# Patient Record
Sex: FEMALE | Race: BLACK OR AFRICAN AMERICAN | NOT HISPANIC OR LATINO | Employment: OTHER | URBAN - METROPOLITAN AREA
[De-identification: names, ages, dates, MRNs, and addresses within clinical notes are randomized per-mention and may not be internally consistent; named-entity substitution may affect disease eponyms.]

---

## 2017-09-22 ENCOUNTER — HOSPITAL ENCOUNTER (INPATIENT)
Facility: HOSPITAL | Age: 77
LOS: 3 days | Discharge: HOME | End: 2017-09-25
Attending: EMERGENCY MEDICINE | Admitting: INTERNAL MEDICINE

## 2017-09-22 ENCOUNTER — APPOINTMENT (OUTPATIENT)
Dept: GENERAL RADIOLOGY | Facility: HOSPITAL | Age: 77
End: 2017-09-22

## 2017-09-22 ENCOUNTER — APPOINTMENT (OUTPATIENT)
Dept: CT IMAGING | Facility: HOSPITAL | Age: 77
End: 2017-09-22

## 2017-09-22 DIAGNOSIS — K11.20 PAROTITIS: ICD-10-CM

## 2017-09-22 DIAGNOSIS — I16.9 HYPERTENSIVE CRISIS: Primary | ICD-10-CM

## 2017-09-22 LAB
ALBUMIN SERPL-MCNC: 4.1 G/DL (ref 3.5–5.2)
ALBUMIN/GLOB SERPL: 1.5 G/DL
ALP SERPL-CCNC: 72 U/L (ref 39–117)
ALT SERPL W P-5'-P-CCNC: 13 U/L (ref 1–33)
ANION GAP SERPL CALCULATED.3IONS-SCNC: 9.8 MMOL/L
AST SERPL-CCNC: 20 U/L (ref 1–32)
BASOPHILS # BLD AUTO: 0.03 10*3/MM3 (ref 0–0.2)
BASOPHILS NFR BLD AUTO: 0.8 % (ref 0–1.5)
BILIRUB SERPL-MCNC: 0.3 MG/DL (ref 0.1–1.2)
BUN BLD-MCNC: 10 MG/DL (ref 8–23)
BUN/CREAT SERPL: 7.8 (ref 7–25)
CALCIUM SPEC-SCNC: 10.3 MG/DL (ref 8.6–10.5)
CHLORIDE SERPL-SCNC: 105 MMOL/L (ref 98–107)
CO2 SERPL-SCNC: 30.2 MMOL/L (ref 22–29)
CREAT BLD-MCNC: 1.28 MG/DL (ref 0.57–1)
DEPRECATED RDW RBC AUTO: 45.4 FL (ref 37–54)
EOSINOPHIL # BLD AUTO: 0.1 10*3/MM3 (ref 0–0.7)
EOSINOPHIL NFR BLD AUTO: 2.6 % (ref 0.3–6.2)
ERYTHROCYTE [DISTWIDTH] IN BLOOD BY AUTOMATED COUNT: 14.6 % (ref 11.7–13)
GFR SERPL CREATININE-BSD FRML MDRD: 49 ML/MIN/1.73
GLOBULIN UR ELPH-MCNC: 2.8 GM/DL
GLUCOSE BLD-MCNC: 101 MG/DL (ref 65–99)
HCT VFR BLD AUTO: 38.6 % (ref 35.6–45.5)
HGB BLD-MCNC: 12.8 G/DL (ref 11.9–15.5)
IMM GRANULOCYTES # BLD: 0 10*3/MM3 (ref 0–0.03)
IMM GRANULOCYTES NFR BLD: 0 % (ref 0–0.5)
LYMPHOCYTES # BLD AUTO: 1.17 10*3/MM3 (ref 0.9–4.8)
LYMPHOCYTES NFR BLD AUTO: 30 % (ref 19.6–45.3)
MCH RBC QN AUTO: 27.9 PG (ref 26.9–32)
MCHC RBC AUTO-ENTMCNC: 33.2 G/DL (ref 32.4–36.3)
MCV RBC AUTO: 84.1 FL (ref 80.5–98.2)
MONOCYTES # BLD AUTO: 0.5 10*3/MM3 (ref 0.2–1.2)
MONOCYTES NFR BLD AUTO: 12.8 % (ref 5–12)
NEUTROPHILS # BLD AUTO: 2.1 10*3/MM3 (ref 1.9–8.1)
NEUTROPHILS NFR BLD AUTO: 53.8 % (ref 42.7–76)
NT-PROBNP SERPL-MCNC: 581.2 PG/ML (ref 0–1800)
PLATELET # BLD AUTO: 208 10*3/MM3 (ref 140–500)
PMV BLD AUTO: 10.7 FL (ref 6–12)
POTASSIUM BLD-SCNC: 3.8 MMOL/L (ref 3.5–5.2)
PROT SERPL-MCNC: 6.9 G/DL (ref 6–8.5)
RBC # BLD AUTO: 4.59 10*6/MM3 (ref 3.9–5.2)
SODIUM BLD-SCNC: 145 MMOL/L (ref 136–145)
TROPONIN T SERPL-MCNC: <0.01 NG/ML (ref 0–0.03)
WBC NRBC COR # BLD: 3.9 10*3/MM3 (ref 4.5–10.7)

## 2017-09-22 PROCEDURE — 83880 ASSAY OF NATRIURETIC PEPTIDE: CPT | Performed by: PHYSICIAN ASSISTANT

## 2017-09-22 PROCEDURE — 84484 ASSAY OF TROPONIN QUANT: CPT | Performed by: PHYSICIAN ASSISTANT

## 2017-09-22 PROCEDURE — 85025 COMPLETE CBC W/AUTO DIFF WBC: CPT | Performed by: PHYSICIAN ASSISTANT

## 2017-09-22 PROCEDURE — 71020 HC CHEST PA AND LATERAL: CPT

## 2017-09-22 PROCEDURE — 70486 CT MAXILLOFACIAL W/O DYE: CPT

## 2017-09-22 PROCEDURE — 93005 ELECTROCARDIOGRAM TRACING: CPT | Performed by: PHYSICIAN ASSISTANT

## 2017-09-22 PROCEDURE — 93010 ELECTROCARDIOGRAM REPORT: CPT | Performed by: INTERNAL MEDICINE

## 2017-09-22 PROCEDURE — 80053 COMPREHEN METABOLIC PANEL: CPT | Performed by: PHYSICIAN ASSISTANT

## 2017-09-22 PROCEDURE — 36415 COLL VENOUS BLD VENIPUNCTURE: CPT | Performed by: PHYSICIAN ASSISTANT

## 2017-09-22 PROCEDURE — 99284 EMERGENCY DEPT VISIT MOD MDM: CPT

## 2017-09-22 RX ORDER — BISACODYL 5 MG/1
5 TABLET, DELAYED RELEASE ORAL DAILY PRN
COMMUNITY
End: 2019-04-04

## 2017-09-22 RX ORDER — FOLIC ACID 1 MG/1
1 TABLET ORAL DAILY
COMMUNITY
End: 2019-03-04

## 2017-09-22 RX ORDER — LABETALOL HYDROCHLORIDE 5 MG/ML
10 INJECTION, SOLUTION INTRAVENOUS ONCE
Status: COMPLETED | OUTPATIENT
Start: 2017-09-22 | End: 2017-09-22

## 2017-09-22 RX ORDER — ACYCLOVIR 400 MG/1
TABLET ORAL DAILY
COMMUNITY
Start: 2015-01-15 | End: 2019-08-20 | Stop reason: SDUPTHER

## 2017-09-22 RX ORDER — VALSARTAN 320 MG/1
160 TABLET ORAL DAILY
COMMUNITY
Start: 2015-01-15 | End: 2019-03-04

## 2017-09-22 RX ORDER — HYDRALAZINE HYDROCHLORIDE 50 MG/1
TABLET, FILM COATED ORAL 3 TIMES DAILY
COMMUNITY
Start: 2015-01-15 | End: 2017-09-25 | Stop reason: HOSPADM

## 2017-09-22 RX ORDER — CHOLECALCIFEROL (VITAMIN D3) 125 MCG
TABLET ORAL
COMMUNITY
Start: 2015-01-15 | End: 2017-09-25 | Stop reason: HOSPADM

## 2017-09-22 RX ORDER — FUROSEMIDE 20 MG/1
20 TABLET ORAL DAILY PRN
COMMUNITY
Start: 2015-01-15 | End: 2019-03-04 | Stop reason: ALTCHOICE

## 2017-09-22 RX ORDER — POTASSIUM CHLORIDE 750 MG/1
10 TABLET, FILM COATED, EXTENDED RELEASE ORAL 2 TIMES DAILY PRN
COMMUNITY
End: 2019-03-04

## 2017-09-22 RX ORDER — CARVEDILOL 25 MG/1
TABLET ORAL DAILY
COMMUNITY
Start: 2015-01-15 | End: 2019-03-04 | Stop reason: ALTCHOICE

## 2017-09-22 RX ORDER — OMEPRAZOLE 20 MG/1
20 CAPSULE, DELAYED RELEASE ORAL DAILY
COMMUNITY
Start: 2015-01-15 | End: 2019-03-04

## 2017-09-22 RX ORDER — CETIRIZINE HYDROCHLORIDE 10 MG/1
10 TABLET ORAL DAILY PRN
COMMUNITY
End: 2019-04-04

## 2017-09-22 RX ADMIN — LABETALOL HYDROCHLORIDE 10 MG: 5 INJECTION, SOLUTION INTRAVENOUS at 21:11

## 2017-09-22 RX ADMIN — Medication 5 MG/HR: at 22:50

## 2017-09-22 RX ADMIN — NICARDIPINE HYDROCHLORIDE 5 MG/HR: 25 INJECTION INTRAVENOUS at 22:50

## 2017-09-23 ENCOUNTER — APPOINTMENT (OUTPATIENT)
Dept: CARDIOLOGY | Facility: HOSPITAL | Age: 77
End: 2017-09-23
Attending: INTERNAL MEDICINE

## 2017-09-23 PROBLEM — N17.9 AKI (ACUTE KIDNEY INJURY) (HCC): Status: ACTIVE | Noted: 2017-09-23

## 2017-09-23 PROBLEM — R60.0 LOCALIZED EDEMA: Status: ACTIVE | Noted: 2017-09-23

## 2017-09-23 PROBLEM — K11.20 PAROTIDITIS: Status: ACTIVE | Noted: 2017-09-23

## 2017-09-23 LAB
ANION GAP SERPL CALCULATED.3IONS-SCNC: 15.2 MMOL/L
BASOPHILS # BLD AUTO: 0.04 10*3/MM3 (ref 0–0.2)
BASOPHILS NFR BLD AUTO: 0.7 % (ref 0–1.5)
BH CV ECHO MEAS - DIST REN A EDV LEFT: 7.9 CM/SEC
BH CV ECHO MEAS - DIST REN A PSV LEFT: 59.4 CM/SEC
BH CV ECHO MEAS - DIST REN A RI LEFT: 0.87
BH CV ECHO MEAS - MID REN A EDV LEFT: 15.1 CM/SEC
BH CV ECHO MEAS - MID REN A PSV LEFT: 85.9 CM/SEC
BH CV ECHO MEAS - MID REN A RI LEFT: 0.82
BH CV ECHO MEAS - PROX REN A EDV LEFT: 15.1 CM/SEC
BH CV ECHO MEAS - PROX REN A PSV LEFT: 99 CM/SEC
BH CV ECHO MEAS - PROX REN A RI LEFT: 0.85
BH CV VAS BP LEFT ARM: NORMAL MMHG
BH CV VAS BP RIGHT ARM: NORMAL MMHG
BH CV VAS RENAL AORTIC MID PSV: 83 CM/S
BH CV VAS RENAL HILUM LEFT EDV: 6 CM/S
BH CV VAS RENAL HILUM LEFT PSV: 23 CM/S
BH CV VAS RENAL HILUM RIGHT EDV: 4 CM/S
BH CV VAS RENAL HILUM RIGHT PSV: 28 CM/S
BH CV XLRA MEAS - KID L LEFT: 10.2 CM
BH CV XLRA MEAS - RENAL A ORG RI LEFT: 0.86
BH CV XLRA MEAS DIST REN A EDV RIGHT: 13.1 CM/SEC
BH CV XLRA MEAS DIST REN A PSV RIGHT: 90.7 CM/SEC
BH CV XLRA MEAS DIST REN A RI RIGHT: 0.86
BH CV XLRA MEAS KID L RIGHT: 8.3 CM
BH CV XLRA MEAS KID W RIGHT: 4.2 CM
BH CV XLRA MEAS MID REN A EDV RIGHT: 15.1 CM/SEC
BH CV XLRA MEAS MID REN A PSV RIGHT: 94.2 CM/SEC
BH CV XLRA MEAS MID REN A RI RIGHT: 0.84
BH CV XLRA MEAS PROX REN A EDV RIGHT: 12.4 CM/SEC
BH CV XLRA MEAS PROX REN A PSV RIGHT: 78.4 CM/SEC
BH CV XLRA MEAS PROX REN A RI RIGHT: 0.84
BH CV XLRA MEAS RAR LEFT: 1.2
BH CV XLRA MEAS RAR RIGHT: 1.4
BH CV XLRA MEAS RENAL A ORG EDV LEFT: 13 CM/SEC
BH CV XLRA MEAS RENAL A ORG EDV RIGHT: 16.5 CM/SEC
BH CV XLRA MEAS RENAL A ORG PSV LEFT: 86 CM/SEC
BH CV XLRA MEAS RENAL A ORG PSV RIGHT: 111 CM/SEC
BH CV XLRA MEAS RENAL A ORG RI RIGHT: 0.85
BUN BLD-MCNC: 9 MG/DL (ref 8–23)
BUN/CREAT SERPL: 8.5 (ref 7–25)
CALCIUM SPEC-SCNC: 9.9 MG/DL (ref 8.6–10.5)
CHLORIDE SERPL-SCNC: 105 MMOL/L (ref 98–107)
CO2 SERPL-SCNC: 23.8 MMOL/L (ref 22–29)
CREAT BLD-MCNC: 1.06 MG/DL (ref 0.57–1)
D DIMER PPP FEU-MCNC: 0.92 MCGFEU/ML (ref 0–0.49)
DEPRECATED RDW RBC AUTO: 45 FL (ref 37–54)
EOSINOPHIL # BLD AUTO: 0.13 10*3/MM3 (ref 0–0.7)
EOSINOPHIL NFR BLD AUTO: 2.4 % (ref 0.3–6.2)
ERYTHROCYTE [DISTWIDTH] IN BLOOD BY AUTOMATED COUNT: 14.4 % (ref 11.7–13)
GFR SERPL CREATININE-BSD FRML MDRD: 61 ML/MIN/1.73
GLUCOSE BLD-MCNC: 93 MG/DL (ref 65–99)
HCT VFR BLD AUTO: 39 % (ref 35.6–45.5)
HGB BLD-MCNC: 12.8 G/DL (ref 11.9–15.5)
IMM GRANULOCYTES # BLD: 0.02 10*3/MM3 (ref 0–0.03)
IMM GRANULOCYTES NFR BLD: 0.4 % (ref 0–0.5)
INR PPP: 1.04 (ref 0.9–1.1)
LEFT KIDNEY WIDTH: 5 CM
LEFT RENAL UPPER PARENCHYMA MAX: 20 CM/S
LEFT RENAL UPPER PARENCHYMA MIN: 5 CM/S
LEFT RENAL UPPER PARENCHYMA RI: 0.75
LYMPHOCYTES # BLD AUTO: 1.09 10*3/MM3 (ref 0.9–4.8)
LYMPHOCYTES NFR BLD AUTO: 20.3 % (ref 19.6–45.3)
MAGNESIUM SERPL-MCNC: 2.2 MG/DL (ref 1.6–2.4)
MCH RBC QN AUTO: 27.9 PG (ref 26.9–32)
MCHC RBC AUTO-ENTMCNC: 32.8 G/DL (ref 32.4–36.3)
MCV RBC AUTO: 85.2 FL (ref 80.5–98.2)
MONOCYTES # BLD AUTO: 0.63 10*3/MM3 (ref 0.2–1.2)
MONOCYTES NFR BLD AUTO: 11.8 % (ref 5–12)
NEUTROPHILS # BLD AUTO: 3.45 10*3/MM3 (ref 1.9–8.1)
NEUTROPHILS NFR BLD AUTO: 64.4 % (ref 42.7–76)
PLATELET # BLD AUTO: 196 10*3/MM3 (ref 140–500)
PMV BLD AUTO: 10.7 FL (ref 6–12)
POTASSIUM BLD-SCNC: 3.3 MMOL/L (ref 3.5–5.2)
PROTHROMBIN TIME: 13.2 SECONDS (ref 11.7–14.2)
RBC # BLD AUTO: 4.58 10*6/MM3 (ref 3.9–5.2)
RIGHT RENAL UPPER PARENCHYMA MAX: 13 CM/S
RIGHT RENAL UPPER PARENCHYMA MIN: 3 CM/S
RIGHT RENAL UPPER PARENCHYMA RI: 0.77
SODIUM BLD-SCNC: 144 MMOL/L (ref 136–145)
T4 FREE SERPL-MCNC: 1.44 NG/DL (ref 0.93–1.7)
TSH SERPL DL<=0.05 MIU/L-ACNC: 1.83 MIU/ML (ref 0.27–4.2)
WBC NRBC COR # BLD: 5.36 10*3/MM3 (ref 4.5–10.7)

## 2017-09-23 PROCEDURE — 85379 FIBRIN DEGRADATION QUANT: CPT | Performed by: INTERNAL MEDICINE

## 2017-09-23 PROCEDURE — 93975 VASCULAR STUDY: CPT

## 2017-09-23 PROCEDURE — 84244 ASSAY OF RENIN: CPT | Performed by: INTERNAL MEDICINE

## 2017-09-23 PROCEDURE — 83735 ASSAY OF MAGNESIUM: CPT | Performed by: INTERNAL MEDICINE

## 2017-09-23 PROCEDURE — 85025 COMPLETE CBC W/AUTO DIFF WBC: CPT | Performed by: INTERNAL MEDICINE

## 2017-09-23 PROCEDURE — 84443 ASSAY THYROID STIM HORMONE: CPT | Performed by: INTERNAL MEDICINE

## 2017-09-23 PROCEDURE — 25010000002 ENOXAPARIN PER 10 MG: Performed by: INTERNAL MEDICINE

## 2017-09-23 PROCEDURE — 83835 ASSAY OF METANEPHRINES: CPT | Performed by: INTERNAL MEDICINE

## 2017-09-23 PROCEDURE — 80048 BASIC METABOLIC PNL TOTAL CA: CPT | Performed by: INTERNAL MEDICINE

## 2017-09-23 PROCEDURE — 84439 ASSAY OF FREE THYROXINE: CPT | Performed by: INTERNAL MEDICINE

## 2017-09-23 PROCEDURE — 85610 PROTHROMBIN TIME: CPT | Performed by: INTERNAL MEDICINE

## 2017-09-23 RX ORDER — SODIUM CHLORIDE 9 MG/ML
75 INJECTION, SOLUTION INTRAVENOUS CONTINUOUS
Status: DISCONTINUED | OUTPATIENT
Start: 2017-09-23 | End: 2017-09-24

## 2017-09-23 RX ORDER — SODIUM CHLORIDE 0.9 % (FLUSH) 0.9 %
1-10 SYRINGE (ML) INJECTION AS NEEDED
Status: DISCONTINUED | OUTPATIENT
Start: 2017-09-23 | End: 2017-09-25 | Stop reason: HOSPADM

## 2017-09-23 RX ORDER — HYDROCODONE BITARTRATE AND ACETAMINOPHEN 5; 325 MG/1; MG/1
1 TABLET ORAL EVERY 4 HOURS PRN
Status: DISCONTINUED | OUTPATIENT
Start: 2017-09-23 | End: 2017-09-25 | Stop reason: HOSPADM

## 2017-09-23 RX ORDER — SENNA AND DOCUSATE SODIUM 50; 8.6 MG/1; MG/1
2 TABLET, FILM COATED ORAL NIGHTLY PRN
Status: DISCONTINUED | OUTPATIENT
Start: 2017-09-23 | End: 2017-09-25 | Stop reason: HOSPADM

## 2017-09-23 RX ORDER — AMOXICILLIN AND CLAVULANATE POTASSIUM 875; 125 MG/1; MG/1
1 TABLET, FILM COATED ORAL EVERY 12 HOURS SCHEDULED
Status: DISCONTINUED | OUTPATIENT
Start: 2017-09-23 | End: 2017-09-25 | Stop reason: HOSPADM

## 2017-09-23 RX ORDER — HYDRALAZINE HYDROCHLORIDE 50 MG/1
50 TABLET, FILM COATED ORAL ONCE
Status: COMPLETED | OUTPATIENT
Start: 2017-09-23 | End: 2017-09-23

## 2017-09-23 RX ORDER — PANTOPRAZOLE SODIUM 40 MG/1
40 TABLET, DELAYED RELEASE ORAL EVERY MORNING
Status: DISCONTINUED | OUTPATIENT
Start: 2017-09-23 | End: 2017-09-25 | Stop reason: HOSPADM

## 2017-09-23 RX ORDER — ACETAMINOPHEN 325 MG/1
650 TABLET ORAL EVERY 4 HOURS PRN
Status: DISCONTINUED | OUTPATIENT
Start: 2017-09-23 | End: 2017-09-25 | Stop reason: HOSPADM

## 2017-09-23 RX ORDER — FOLIC ACID 1 MG/1
1 TABLET ORAL DAILY
Status: DISCONTINUED | OUTPATIENT
Start: 2017-09-23 | End: 2017-09-25 | Stop reason: HOSPADM

## 2017-09-23 RX ORDER — HYDRALAZINE HYDROCHLORIDE 20 MG/ML
20 INJECTION INTRAMUSCULAR; INTRAVENOUS EVERY 6 HOURS PRN
Status: DISCONTINUED | OUTPATIENT
Start: 2017-09-23 | End: 2017-09-25 | Stop reason: HOSPADM

## 2017-09-23 RX ORDER — ERGOCALCIFEROL 1.25 MG/1
50000 CAPSULE ORAL
COMMUNITY

## 2017-09-23 RX ORDER — CARVEDILOL 25 MG/1
25 TABLET ORAL 2 TIMES DAILY WITH MEALS
Status: DISCONTINUED | OUTPATIENT
Start: 2017-09-23 | End: 2017-09-25 | Stop reason: HOSPADM

## 2017-09-23 RX ORDER — CETIRIZINE HYDROCHLORIDE 10 MG/1
5 TABLET ORAL DAILY
Status: DISCONTINUED | OUTPATIENT
Start: 2017-09-23 | End: 2017-09-25 | Stop reason: HOSPADM

## 2017-09-23 RX ORDER — ONDANSETRON 4 MG/1
4 TABLET, ORALLY DISINTEGRATING ORAL EVERY 6 HOURS PRN
Status: DISCONTINUED | OUTPATIENT
Start: 2017-09-23 | End: 2017-09-25 | Stop reason: HOSPADM

## 2017-09-23 RX ORDER — CARVEDILOL 25 MG/1
25 TABLET ORAL ONCE
Status: COMPLETED | OUTPATIENT
Start: 2017-09-23 | End: 2017-09-23

## 2017-09-23 RX ORDER — ONDANSETRON 4 MG/1
4 TABLET, FILM COATED ORAL EVERY 6 HOURS PRN
Status: DISCONTINUED | OUTPATIENT
Start: 2017-09-23 | End: 2017-09-25 | Stop reason: HOSPADM

## 2017-09-23 RX ORDER — HYDRALAZINE HYDROCHLORIDE 50 MG/1
50 TABLET, FILM COATED ORAL EVERY 8 HOURS SCHEDULED
Status: DISCONTINUED | OUTPATIENT
Start: 2017-09-23 | End: 2017-09-25

## 2017-09-23 RX ORDER — ONDANSETRON 2 MG/ML
4 INJECTION INTRAMUSCULAR; INTRAVENOUS EVERY 6 HOURS PRN
Status: DISCONTINUED | OUTPATIENT
Start: 2017-09-23 | End: 2017-09-25 | Stop reason: HOSPADM

## 2017-09-23 RX ADMIN — CARVEDILOL 25 MG: 25 TABLET, FILM COATED ORAL at 03:45

## 2017-09-23 RX ADMIN — CETIRIZINE HYDROCHLORIDE 5 MG: 10 TABLET, FILM COATED ORAL at 08:41

## 2017-09-23 RX ADMIN — CARVEDILOL 25 MG: 25 TABLET, FILM COATED ORAL at 17:40

## 2017-09-23 RX ADMIN — FOLIC ACID 1 MG: 1 TABLET ORAL at 08:41

## 2017-09-23 RX ADMIN — PANTOPRAZOLE SODIUM 40 MG: 40 TABLET, DELAYED RELEASE ORAL at 08:41

## 2017-09-23 RX ADMIN — AMOXICILLIN AND CLAVULANATE POTASSIUM 1 TABLET: 875; 125 TABLET, FILM COATED ORAL at 03:45

## 2017-09-23 RX ADMIN — SODIUM CHLORIDE 75 ML/HR: 9 INJECTION, SOLUTION INTRAVENOUS at 15:51

## 2017-09-23 RX ADMIN — SODIUM CHLORIDE 75 ML/HR: 9 INJECTION, SOLUTION INTRAVENOUS at 03:44

## 2017-09-23 RX ADMIN — AMOXICILLIN AND CLAVULANATE POTASSIUM 1 TABLET: 875; 125 TABLET, FILM COATED ORAL at 20:23

## 2017-09-23 RX ADMIN — HYDRALAZINE HYDROCHLORIDE 50 MG: 50 TABLET, FILM COATED ORAL at 03:45

## 2017-09-23 RX ADMIN — AMOXICILLIN AND CLAVULANATE POTASSIUM 1 TABLET: 875; 125 TABLET, FILM COATED ORAL at 08:41

## 2017-09-23 RX ADMIN — HYDRALAZINE HYDROCHLORIDE 50 MG: 50 TABLET, FILM COATED ORAL at 21:37

## 2017-09-23 RX ADMIN — HYDRALAZINE HYDROCHLORIDE 50 MG: 50 TABLET, FILM COATED ORAL at 13:47

## 2017-09-23 RX ADMIN — ENOXAPARIN SODIUM 40 MG: 40 INJECTION SUBCUTANEOUS at 08:41

## 2017-09-23 RX ADMIN — CARVEDILOL 25 MG: 25 TABLET, FILM COATED ORAL at 08:41

## 2017-09-24 LAB
AMPHET+METHAMPHET UR QL: NEGATIVE
BARBITURATES UR QL SCN: NEGATIVE
BENZODIAZ UR QL SCN: NEGATIVE
BILIRUB UR QL STRIP: NEGATIVE
CANNABINOIDS SERPL QL: NEGATIVE
CLARITY UR: CLEAR
COCAINE UR QL: NEGATIVE
COLOR UR: YELLOW
GLUCOSE UR STRIP-MCNC: NEGATIVE MG/DL
HGB UR QL STRIP.AUTO: NEGATIVE
KETONES UR QL STRIP: ABNORMAL
LEUKOCYTE ESTERASE UR QL STRIP.AUTO: NEGATIVE
METHADONE UR QL SCN: NEGATIVE
NITRITE UR QL STRIP: NEGATIVE
OPIATES UR QL: NEGATIVE
OXYCODONE UR QL SCN: NEGATIVE
PH UR STRIP.AUTO: 7 [PH] (ref 5–8)
PROT UR QL STRIP: NEGATIVE
SP GR UR STRIP: 1.01 (ref 1–1.03)
UROBILINOGEN UR QL STRIP: ABNORMAL

## 2017-09-24 PROCEDURE — 82570 ASSAY OF URINE CREATININE: CPT | Performed by: INTERNAL MEDICINE

## 2017-09-24 PROCEDURE — 80307 DRUG TEST PRSMV CHEM ANLYZR: CPT | Performed by: INTERNAL MEDICINE

## 2017-09-24 PROCEDURE — 81003 URINALYSIS AUTO W/O SCOPE: CPT | Performed by: INTERNAL MEDICINE

## 2017-09-24 PROCEDURE — 25010000002 ENOXAPARIN PER 10 MG: Performed by: INTERNAL MEDICINE

## 2017-09-24 PROCEDURE — 83835 ASSAY OF METANEPHRINES: CPT | Performed by: INTERNAL MEDICINE

## 2017-09-24 RX ORDER — VALSARTAN 320 MG/1
320 TABLET ORAL
Status: DISCONTINUED | OUTPATIENT
Start: 2017-09-24 | End: 2017-09-25 | Stop reason: HOSPADM

## 2017-09-24 RX ADMIN — HYDRALAZINE HYDROCHLORIDE 50 MG: 50 TABLET, FILM COATED ORAL at 06:03

## 2017-09-24 RX ADMIN — HYDRALAZINE HYDROCHLORIDE 50 MG: 50 TABLET, FILM COATED ORAL at 14:20

## 2017-09-24 RX ADMIN — AMOXICILLIN AND CLAVULANATE POTASSIUM 1 TABLET: 875; 125 TABLET, FILM COATED ORAL at 21:27

## 2017-09-24 RX ADMIN — CARVEDILOL 25 MG: 25 TABLET, FILM COATED ORAL at 17:18

## 2017-09-24 RX ADMIN — AMOXICILLIN AND CLAVULANATE POTASSIUM 1 TABLET: 875; 125 TABLET, FILM COATED ORAL at 08:13

## 2017-09-24 RX ADMIN — HYDRALAZINE HYDROCHLORIDE 50 MG: 50 TABLET, FILM COATED ORAL at 21:27

## 2017-09-24 RX ADMIN — SODIUM CHLORIDE 75 ML/HR: 9 INJECTION, SOLUTION INTRAVENOUS at 05:03

## 2017-09-24 RX ADMIN — FOLIC ACID 1 MG: 1 TABLET ORAL at 08:12

## 2017-09-24 RX ADMIN — CARVEDILOL 25 MG: 25 TABLET, FILM COATED ORAL at 08:13

## 2017-09-24 RX ADMIN — CETIRIZINE HYDROCHLORIDE 5 MG: 10 TABLET, FILM COATED ORAL at 08:13

## 2017-09-24 RX ADMIN — VALSARTAN 320 MG: 320 TABLET, FILM COATED ORAL at 13:04

## 2017-09-24 RX ADMIN — PANTOPRAZOLE SODIUM 40 MG: 40 TABLET, DELAYED RELEASE ORAL at 06:03

## 2017-09-24 RX ADMIN — ENOXAPARIN SODIUM 40 MG: 40 INJECTION SUBCUTANEOUS at 08:13

## 2017-09-25 VITALS
TEMPERATURE: 97.7 F | OXYGEN SATURATION: 97 % | RESPIRATION RATE: 18 BRPM | HEIGHT: 60 IN | HEART RATE: 74 BPM | WEIGHT: 148 LBS | SYSTOLIC BLOOD PRESSURE: 170 MMHG | BODY MASS INDEX: 29.06 KG/M2 | DIASTOLIC BLOOD PRESSURE: 89 MMHG

## 2017-09-25 PROCEDURE — 25010000002 HYDRALAZINE PER 20 MG: Performed by: INTERNAL MEDICINE

## 2017-09-25 PROCEDURE — 25010000002 INFLUENZA VAC SUBUNIT QUAD 0.5 ML SUSPENSION PREFILLED SYRINGE: Performed by: INTERNAL MEDICINE

## 2017-09-25 PROCEDURE — 90661 CCIIV3 VAC ABX FR 0.5 ML IM: CPT | Performed by: INTERNAL MEDICINE

## 2017-09-25 PROCEDURE — G0008 ADMIN INFLUENZA VIRUS VAC: HCPCS | Performed by: INTERNAL MEDICINE

## 2017-09-25 PROCEDURE — 25010000002 ENOXAPARIN PER 10 MG: Performed by: INTERNAL MEDICINE

## 2017-09-25 RX ORDER — AMOXICILLIN AND CLAVULANATE POTASSIUM 875; 125 MG/1; MG/1
1 TABLET, FILM COATED ORAL EVERY 12 HOURS SCHEDULED
Start: 2017-09-25 | End: 2019-03-04 | Stop reason: ALTCHOICE

## 2017-09-25 RX ORDER — HYDRALAZINE HYDROCHLORIDE 25 MG/1
75 TABLET, FILM COATED ORAL EVERY 8 HOURS SCHEDULED
Start: 2017-09-25 | End: 2019-03-04

## 2017-09-25 RX ADMIN — A/SINGAPORE/GP1908/2015 IVR-180 (H1N1) (AN A/MICHIGAN/45/2015-LIKE VIRUS), A/SINGAPORE/GP2050/2015 (H3N2) (AN A/HONG KONG/4801/2014 - LIKE VIRUS), B/UTAH/9/2014 (A B/PHUKET/3073/2013-LIKE VIRUS), B/HONG KONG/259/2010 (A B/BRISBANE/60/08-LIKE VIRUS) 0.5 ML: 15; 15; 15; 15 INJECTION, SUSPENSION INTRAMUSCULAR at 10:49

## 2017-09-25 RX ADMIN — PANTOPRAZOLE SODIUM 40 MG: 40 TABLET, DELAYED RELEASE ORAL at 06:21

## 2017-09-25 RX ADMIN — CARVEDILOL 25 MG: 25 TABLET, FILM COATED ORAL at 08:24

## 2017-09-25 RX ADMIN — ENOXAPARIN SODIUM 40 MG: 40 INJECTION SUBCUTANEOUS at 08:25

## 2017-09-25 RX ADMIN — HYDRALAZINE HYDROCHLORIDE 75 MG: 50 TABLET, FILM COATED ORAL at 11:58

## 2017-09-25 RX ADMIN — FOLIC ACID 1 MG: 1 TABLET ORAL at 08:24

## 2017-09-25 RX ADMIN — CETIRIZINE HYDROCHLORIDE 5 MG: 10 TABLET, FILM COATED ORAL at 08:24

## 2017-09-25 RX ADMIN — HYDRALAZINE HYDROCHLORIDE 50 MG: 50 TABLET, FILM COATED ORAL at 06:21

## 2017-09-25 RX ADMIN — VALSARTAN 320 MG: 320 TABLET, FILM COATED ORAL at 08:24

## 2017-09-25 RX ADMIN — HYDRALAZINE HYDROCHLORIDE 20 MG: 20 INJECTION INTRAMUSCULAR; INTRAVENOUS at 00:02

## 2017-09-25 RX ADMIN — AMOXICILLIN AND CLAVULANATE POTASSIUM 1 TABLET: 875; 125 TABLET, FILM COATED ORAL at 08:24

## 2017-09-27 LAB
ALDOST SERPL-MCNC: 4.4 NG/DL (ref 0–30)
ALDOST/RENIN PLAS-RTO: 23.3 {RATIO} (ref 0–30)
METANEPHRINE, PL: 22 PG/ML (ref 0–62)
NORMETANEPHRINE, PL: 90 PG/ML (ref 0–145)
RENIN PLAS-CCNC: 0.19 NG/ML/HR (ref 0.17–5.38)

## 2017-09-28 LAB
CREAT 24H UR-MCNC: 45.7 MG/DL
METANEPH/CREAT UR: 0.5 UG/MG CREAT (ref 0–1)
METANEPHS UR-MCNC: 48 UG/L
NORMETANEPHRINE 24H UR-MCNC: 168 UG/L

## 2018-02-06 ENCOUNTER — HOSPITAL ENCOUNTER (EMERGENCY)
Facility: HOSPITAL | Age: 78
Discharge: HOME | End: 2018-02-06
Attending: EMERGENCY MEDICINE | Admitting: EMERGENCY MEDICINE

## 2018-02-06 ENCOUNTER — APPOINTMENT (OUTPATIENT)
Dept: CT IMAGING | Facility: HOSPITAL | Age: 78
End: 2018-02-06

## 2018-02-06 VITALS
DIASTOLIC BLOOD PRESSURE: 81 MMHG | SYSTOLIC BLOOD PRESSURE: 173 MMHG | OXYGEN SATURATION: 98 % | HEIGHT: 60 IN | RESPIRATION RATE: 16 BRPM | WEIGHT: 151 LBS | HEART RATE: 75 BPM | TEMPERATURE: 97.8 F | BODY MASS INDEX: 29.64 KG/M2

## 2018-02-06 DIAGNOSIS — K52.9 GASTROENTERITIS: Primary | ICD-10-CM

## 2018-02-06 LAB
ALBUMIN SERPL-MCNC: 4.5 G/DL (ref 3.5–5.2)
ALBUMIN/GLOB SERPL: 1.3 G/DL
ALP SERPL-CCNC: 87 U/L (ref 39–117)
ALT SERPL W P-5'-P-CCNC: 12 U/L (ref 1–33)
ANION GAP SERPL CALCULATED.3IONS-SCNC: 13.7 MMOL/L
AST SERPL-CCNC: 21 U/L (ref 1–32)
BACTERIA UR QL AUTO: NORMAL /HPF
BASOPHILS # BLD AUTO: 0.02 10*3/MM3 (ref 0–0.2)
BASOPHILS NFR BLD AUTO: 0.2 % (ref 0–1.5)
BILIRUB SERPL-MCNC: 0.5 MG/DL (ref 0.1–1.2)
BILIRUB UR QL STRIP: NEGATIVE
BUN BLD-MCNC: 29 MG/DL (ref 8–23)
BUN/CREAT SERPL: 18.1 (ref 7–25)
CALCIUM SPEC-SCNC: 10.7 MG/DL (ref 8.6–10.5)
CHLORIDE SERPL-SCNC: 100 MMOL/L (ref 98–107)
CLARITY UR: CLEAR
CO2 SERPL-SCNC: 24.3 MMOL/L (ref 22–29)
COLOR UR: YELLOW
CREAT BLD-MCNC: 1.6 MG/DL (ref 0.57–1)
DEPRECATED RDW RBC AUTO: 48.8 FL (ref 37–54)
EOSINOPHIL # BLD AUTO: 0.01 10*3/MM3 (ref 0–0.7)
EOSINOPHIL NFR BLD AUTO: 0.1 % (ref 0.3–6.2)
ERYTHROCYTE [DISTWIDTH] IN BLOOD BY AUTOMATED COUNT: 16.2 % (ref 11.7–13)
FLUAV AG NPH QL: NEGATIVE
FLUBV AG NPH QL IA: NEGATIVE
GFR SERPL CREATININE-BSD FRML MDRD: 38 ML/MIN/1.73
GLOBULIN UR ELPH-MCNC: 3.6 GM/DL
GLUCOSE BLD-MCNC: 119 MG/DL (ref 65–99)
GLUCOSE UR STRIP-MCNC: NEGATIVE MG/DL
HCT VFR BLD AUTO: 42.4 % (ref 35.6–45.5)
HGB BLD-MCNC: 14.2 G/DL (ref 11.9–15.5)
HGB UR QL STRIP.AUTO: ABNORMAL
HYALINE CASTS UR QL AUTO: NORMAL /LPF
IMM GRANULOCYTES # BLD: 0.03 10*3/MM3 (ref 0–0.03)
IMM GRANULOCYTES NFR BLD: 0.3 % (ref 0–0.5)
KETONES UR QL STRIP: NEGATIVE
LEUKOCYTE ESTERASE UR QL STRIP.AUTO: NEGATIVE
LIPASE SERPL-CCNC: 24 U/L (ref 13–60)
LYMPHOCYTES # BLD AUTO: 0.59 10*3/MM3 (ref 0.9–4.8)
LYMPHOCYTES NFR BLD AUTO: 5.9 % (ref 19.6–45.3)
MCH RBC QN AUTO: 27.5 PG (ref 26.9–32)
MCHC RBC AUTO-ENTMCNC: 33.5 G/DL (ref 32.4–36.3)
MCV RBC AUTO: 82.2 FL (ref 80.5–98.2)
MONOCYTES # BLD AUTO: 0.58 10*3/MM3 (ref 0.2–1.2)
MONOCYTES NFR BLD AUTO: 5.8 % (ref 5–12)
NEUTROPHILS # BLD AUTO: 8.72 10*3/MM3 (ref 1.9–8.1)
NEUTROPHILS NFR BLD AUTO: 87.7 % (ref 42.7–76)
NITRITE UR QL STRIP: NEGATIVE
PH UR STRIP.AUTO: 5.5 [PH] (ref 5–8)
PLATELET # BLD AUTO: 206 10*3/MM3 (ref 140–500)
PMV BLD AUTO: 10.7 FL (ref 6–12)
POTASSIUM BLD-SCNC: 4.4 MMOL/L (ref 3.5–5.2)
PROT SERPL-MCNC: 8.1 G/DL (ref 6–8.5)
PROT UR QL STRIP: ABNORMAL
RBC # BLD AUTO: 5.16 10*6/MM3 (ref 3.9–5.2)
RBC # UR: NORMAL /HPF
REF LAB TEST METHOD: NORMAL
SODIUM BLD-SCNC: 138 MMOL/L (ref 136–145)
SP GR UR STRIP: 1.01 (ref 1–1.03)
SQUAMOUS #/AREA URNS HPF: NORMAL /HPF
UROBILINOGEN UR QL STRIP: ABNORMAL
WBC NRBC COR # BLD: 9.95 10*3/MM3 (ref 4.5–10.7)
WBC UR QL AUTO: NORMAL /HPF

## 2018-02-06 PROCEDURE — 81001 URINALYSIS AUTO W/SCOPE: CPT | Performed by: NURSE PRACTITIONER

## 2018-02-06 PROCEDURE — 87804 INFLUENZA ASSAY W/OPTIC: CPT | Performed by: NURSE PRACTITIONER

## 2018-02-06 PROCEDURE — 96376 TX/PRO/DX INJ SAME DRUG ADON: CPT

## 2018-02-06 PROCEDURE — 99285 EMERGENCY DEPT VISIT HI MDM: CPT

## 2018-02-06 PROCEDURE — 96374 THER/PROPH/DIAG INJ IV PUSH: CPT

## 2018-02-06 PROCEDURE — 25010000002 ONDANSETRON PER 1 MG: Performed by: NURSE PRACTITIONER

## 2018-02-06 PROCEDURE — 74176 CT ABD & PELVIS W/O CONTRAST: CPT

## 2018-02-06 PROCEDURE — 80053 COMPREHEN METABOLIC PANEL: CPT | Performed by: NURSE PRACTITIONER

## 2018-02-06 PROCEDURE — 25010000002 ONDANSETRON PER 1 MG: Performed by: EMERGENCY MEDICINE

## 2018-02-06 PROCEDURE — 85025 COMPLETE CBC W/AUTO DIFF WBC: CPT | Performed by: NURSE PRACTITIONER

## 2018-02-06 PROCEDURE — 96375 TX/PRO/DX INJ NEW DRUG ADDON: CPT

## 2018-02-06 PROCEDURE — 83690 ASSAY OF LIPASE: CPT | Performed by: NURSE PRACTITIONER

## 2018-02-06 RX ORDER — ONDANSETRON 2 MG/ML
4 INJECTION INTRAMUSCULAR; INTRAVENOUS ONCE
Status: COMPLETED | OUTPATIENT
Start: 2018-02-06 | End: 2018-02-06

## 2018-02-06 RX ORDER — LABETALOL HYDROCHLORIDE 5 MG/ML
10 INJECTION, SOLUTION INTRAVENOUS ONCE
Status: COMPLETED | OUTPATIENT
Start: 2018-02-06 | End: 2018-02-06

## 2018-02-06 RX ORDER — SPIRONOLACTONE 25 MG/1
25 TABLET ORAL DAILY
COMMUNITY
End: 2019-03-04

## 2018-02-06 RX ORDER — SODIUM CHLORIDE 0.9 % (FLUSH) 0.9 %
10 SYRINGE (ML) INJECTION AS NEEDED
Status: DISCONTINUED | OUTPATIENT
Start: 2018-02-06 | End: 2018-02-06 | Stop reason: HOSPADM

## 2018-02-06 RX ORDER — ONDANSETRON 4 MG/1
4 TABLET, ORALLY DISINTEGRATING ORAL EVERY 8 HOURS PRN
Qty: 15 TABLET | Refills: 0 | Status: SHIPPED | OUTPATIENT
Start: 2018-02-06 | End: 2019-03-04

## 2018-02-06 RX ORDER — HYDROMORPHONE HYDROCHLORIDE 1 MG/ML
0.5 INJECTION, SOLUTION INTRAMUSCULAR; INTRAVENOUS; SUBCUTANEOUS ONCE
Status: COMPLETED | OUTPATIENT
Start: 2018-02-06 | End: 2018-02-06

## 2018-02-06 RX ADMIN — ONDANSETRON 4 MG: 2 INJECTION INTRAMUSCULAR; INTRAVENOUS at 17:28

## 2018-02-06 RX ADMIN — LABETALOL HYDROCHLORIDE 10 MG: 5 INJECTION, SOLUTION INTRAVENOUS at 14:56

## 2018-02-06 RX ADMIN — HYDROMORPHONE HYDROCHLORIDE 0.5 MG: 10 INJECTION INTRAMUSCULAR; INTRAVENOUS; SUBCUTANEOUS at 15:00

## 2018-02-06 RX ADMIN — ONDANSETRON 4 MG: 2 INJECTION INTRAMUSCULAR; INTRAVENOUS at 14:59

## 2018-02-06 RX ADMIN — SODIUM CHLORIDE 1000 ML: 9 INJECTION, SOLUTION INTRAVENOUS at 14:52

## 2018-02-06 NOTE — ED PROVIDER NOTES
Pt presents to the ED with N/V/D that began this morning.  Pt reports associated diaphoresis, abdominal pain, and gas.  Pt denies contact with any sick people.  Pt states that she had fish and coleslaw last night, and no one ate with her.      On exam, Pt is awake, alert, oriented, and in no distress.  Oral mucosa is dry.  Heart is RRR. Lungs are CTAB.    I reviewed Pt's workup.  Labs suggest slight dehydration.  Discussed the plan give liquids in ED to assess her tolerance.  If she is able to tolerate, we discharge with nausea medication.  Pt understands and agrees with the plan, all questions answered.    I supervised care provided by the midlevel provider.    We have discussed this patient's history, physical exam, and treatment plan.   I have reviewed the note and personally saw and examined the patient and agree with the plan of care.    Documentation assistance provided by keven Crowe for Dr. Daugherty.  Information recorded by the scribe was done at my direction and has been verified and validated by me.       Lisa Crowe  02/06/18 2587       Oscar Daugherty MD  02/08/18 3251

## 2018-02-06 NOTE — ED NOTES
Patient given 12 oz po ice water, per Dr. Daugherty for po challenge prior to discharge.      Devin Ordaz RN  02/06/18 4908

## 2018-02-06 NOTE — ED PROVIDER NOTES
EMERGENCY DEPARTMENT ENCOUNTER    CHIEF COMPLAINT  Chief Complaint: N/V   History given by: pt   History limited by: none   Room Number: 17/17  PMD: Katherine Escalante MD      HPI:  Pt is a 77 y.o. female who presents with N/V since waking up at 0700 today.  Patient also complains of diarrhea, diaphoresis, HA, and intermittent abd pain, and states that she has been hypertensive.  Patient denies CP, SOA, back pain, or fever. Pt denies any specific sick contacts. Pt states that she received a flu vaccine this year, and denies recent travel or antibiotic use.   Past Medical History of hypertension, renal disease, and MI with stent placement in 2002.     Duration: since 070 today   Timing: constant  Quality: N/V   Intensity/Severity: moderate   Progression: unchanged   Associated Symptoms:diarrhea, abd pain, diaphoresis, HA  Aggravating Factors: none stated   Alleviating Factors: none stated   Previous Episodes: none stated   Treatment before arrival: none     PAST MEDICAL HISTORY  Active Ambulatory Problems     Diagnosis Date Noted   • Hypertensive crisis 09/22/2017   • Parotiditis 09/23/2017   • IZA (acute kidney injury) 09/23/2017   • Localized edema 09/23/2017     Resolved Ambulatory Problems     Diagnosis Date Noted   • No Resolved Ambulatory Problems     Past Medical History:   Diagnosis Date   • Cardiomegaly    • Hernia    • Herpes    • Hypertension    • Myocardial infarction    • Renal disorder        PAST SURGICAL HISTORY  Past Surgical History:   Procedure Laterality Date   • CYST REMOVAL         FAMILY HISTORY  History reviewed. No pertinent family history.    SOCIAL HISTORY  Social History     Social History   • Marital status:      Spouse name: N/A   • Number of children: N/A   • Years of education: N/A     Occupational History   • Not on file.     Social History Main Topics   • Smoking status: Former Smoker   • Smokeless tobacco: Not on file   • Alcohol use No   • Drug use: No   • Sexual activity:  Defer     Other Topics Concern   • Not on file     Social History Narrative         ALLERGIES  Sulfa antibiotics    REVIEW OF SYSTEMS  Review of Systems   Constitutional: Positive for diaphoresis. Negative for chills and fever.   HENT: Negative for sore throat.    Respiratory: Negative for shortness of breath.    Cardiovascular: Negative for chest pain.   Gastrointestinal: Positive for abdominal pain (intermittent), diarrhea, nausea and vomiting.   Genitourinary: Negative for dysuria.   Musculoskeletal: Negative for back pain.   Skin: Negative for rash.   Neurological: Positive for headaches. Negative for dizziness.   Psychiatric/Behavioral: The patient is not nervous/anxious.        PHYSICAL EXAM  ED Triage Vitals   Temp Heart Rate Resp BP SpO2   02/06/18 1343 02/06/18 1311 02/06/18 1343 02/06/18 1311 02/06/18 1311   97.8 °F (36.6 °C) 67 16 200/94 95 %      Temp src Heart Rate Source Patient Position BP Location FiO2 (%)   02/06/18 1343 02/06/18 1311 02/06/18 1311 -- --   Oral Monitor Sitting         Physical Exam   Constitutional: She is well-developed, well-nourished, and in no distress.   HENT:   Head: Normocephalic.   Mouth/Throat: Mucous membranes are normal.   Eyes: No scleral icterus.   Neck: Normal range of motion.   Cardiovascular: Normal rate, regular rhythm and normal heart sounds.    B/p 205/115   Pulmonary/Chest: Effort normal and breath sounds normal.   Abdominal: Bowel sounds are normal. There is tenderness in the left upper quadrant and left lower quadrant.   Musculoskeletal: Normal range of motion.   Neurological: She is alert.   Skin: Skin is warm and dry.   Psychiatric: Mood and affect normal.   Nursing note and vitals reviewed.      LAB RESULTS  Recent Results (from the past 24 hour(s))   Comprehensive Metabolic Panel    Collection Time: 02/06/18  2:48 PM   Result Value Ref Range    Glucose 119 (H) 65 - 99 mg/dL    BUN 29 (H) 8 - 23 mg/dL    Creatinine 1.60 (H) 0.57 - 1.00 mg/dL    Sodium 138  136 - 145 mmol/L    Potassium 4.4 3.5 - 5.2 mmol/L    Chloride 100 98 - 107 mmol/L    CO2 24.3 22.0 - 29.0 mmol/L    Calcium 10.7 (H) 8.6 - 10.5 mg/dL    Total Protein 8.1 6.0 - 8.5 g/dL    Albumin 4.50 3.50 - 5.20 g/dL    ALT (SGPT) 12 1 - 33 U/L    AST (SGOT) 21 1 - 32 U/L    Alkaline Phosphatase 87 39 - 117 U/L    Total Bilirubin 0.5 0.1 - 1.2 mg/dL    eGFR  African Amer 38 (L) >60 mL/min/1.73    Globulin 3.6 gm/dL    A/G Ratio 1.3 g/dL    BUN/Creatinine Ratio 18.1 7.0 - 25.0    Anion Gap 13.7 mmol/L   Lipase    Collection Time: 02/06/18  2:48 PM   Result Value Ref Range    Lipase 24 13 - 60 U/L   CBC Auto Differential    Collection Time: 02/06/18  2:48 PM   Result Value Ref Range    WBC 9.95 4.50 - 10.70 10*3/mm3    RBC 5.16 3.90 - 5.20 10*6/mm3    Hemoglobin 14.2 11.9 - 15.5 g/dL    Hematocrit 42.4 35.6 - 45.5 %    MCV 82.2 80.5 - 98.2 fL    MCH 27.5 26.9 - 32.0 pg    MCHC 33.5 32.4 - 36.3 g/dL    RDW 16.2 (H) 11.7 - 13.0 %    RDW-SD 48.8 37.0 - 54.0 fl    MPV 10.7 6.0 - 12.0 fL    Platelets 206 140 - 500 10*3/mm3    Neutrophil % 87.7 (H) 42.7 - 76.0 %    Lymphocyte % 5.9 (L) 19.6 - 45.3 %    Monocyte % 5.8 5.0 - 12.0 %    Eosinophil % 0.1 (L) 0.3 - 6.2 %    Basophil % 0.2 0.0 - 1.5 %    Immature Grans % 0.3 0.0 - 0.5 %    Neutrophils, Absolute 8.72 (H) 1.90 - 8.10 10*3/mm3    Lymphocytes, Absolute 0.59 (L) 0.90 - 4.80 10*3/mm3    Monocytes, Absolute 0.58 0.20 - 1.20 10*3/mm3    Eosinophils, Absolute 0.01 0.00 - 0.70 10*3/mm3    Basophils, Absolute 0.02 0.00 - 0.20 10*3/mm3    Immature Grans, Absolute 0.03 0.00 - 0.03 10*3/mm3   Influenza Antigen, Rapid - Swab, Nasopharynx    Collection Time: 02/06/18  2:49 PM   Result Value Ref Range    Influenza A Ag, EIA Negative Negative    Influenza B Ag, EIA Negative Negative       I ordered the above labs and reviewed the results    RADIOLOGY  CT Abdomen Pelvis Without Contrast   Final Result     The CT scan was performed as an emergency procedure through the  abdomen  and pelvis without contrast and demonstrates the followin. The lung bases are clear. There is a small hiatus hernia measuring  4.4 cm. The liver, spleen, pancreas, and both adrenal glands are  unremarkable without intravenous contrast. There is mild atrophic change  involving the right kidney compared to the left. There is no renal  obstruction. The gallbladder shows no gallstones or wall thickening.  2. There is no aortic aneurysm or retroperitoneal lymphadenopathy. There  is scattered diverticulosis throughout the colon but no CT evidence of  diverticulitis. There is scattered fluid in the small bowel which is  nonspecific but could relate to an element of enteritis. No abnormality  is seen in the pelvis.       I ordered the above noted radiological studies and reviewed the images on the PACS system.       PROGRESS AND CONSULTS    1507 Reviewed pt's history and workup with Dr. Daugherty.  At bedside evaluation, they agree with the plan of care.    1533  Pt's b/p ins 189/87 and her heart rate is 77.     1557 Pt care turned over to Dr. Daugherty pending urinalysis and CT abd/pel result.       COURSE & MEDICAL DECISION MAKING  Pertinent Labs and Imaging studies that were ordered and reviewed are noted above.  Results were reviewed/discussed with the patient and they were also made aware of online assess.   Pt also made aware that some labs, such as cultures, will not be resulted during ER visit and follow up with PMD is necessary.     MEDICATIONS GIVEN IN ER  Medications   sodium chloride 0.9 % flush 10 mL (not administered)   sodium chloride 0.9 % bolus 1,000 mL (1,000 mL Intravenous New Bag 18 1452)   HYDROmorphone (DILAUDID) injection 0.5 mg (0.5 mg Intravenous Given 18 1500)   ondansetron (ZOFRAN) injection 4 mg (4 mg Intravenous Given 18 1459)   labetalol (NORMODYNE,TRANDATE) injection 10 mg (10 mg Intravenous Given 18 1456)       BP (!) 189/87 (BP Location: Right arm, Patient Position:  "Lying)  Pulse 77  Temp 97.8 °F (36.6 °C) (Oral)   Resp 17  Ht 152.4 cm (60\")  Wt 68.5 kg (151 lb)  SpO2 94%  BMI 29.49 kg/m2      I personally reviewed the past medical history, past surgical history, social history, family history, current medications and allergies as they appear in this chart.  The scribe's note accurately reflects the work and decisions made by me.     Documentation assistance provided by keven Stauffer for DHAVAL Craft on 2/6/2018 at 4:00 PM. Information recorded by the scribe was done at my direction and has been verified and validated by me.        Michael Stauffer  02/06/18 1609       NAYANA Gurrola  02/08/18 1610    "

## 2018-02-06 NOTE — ED NOTES
Patient vomited while administering zofran.  Patient given warm wash rag for mouth and emesis bag switched.      Devin Ordaz RN  02/06/18 5478

## 2019-03-04 ENCOUNTER — OFFICE VISIT (OUTPATIENT)
Dept: CARDIOLOGY | Facility: CLINIC | Age: 79
End: 2019-03-04

## 2019-03-04 VITALS
SYSTOLIC BLOOD PRESSURE: 183 MMHG | BODY MASS INDEX: 28.53 KG/M2 | DIASTOLIC BLOOD PRESSURE: 77 MMHG | HEIGHT: 63 IN | WEIGHT: 161 LBS | HEART RATE: 70 BPM

## 2019-03-04 DIAGNOSIS — R94.31 ABNORMAL ELECTROCARDIOGRAM: ICD-10-CM

## 2019-03-04 DIAGNOSIS — I10 HYPERTENSION, UNSPECIFIED TYPE: ICD-10-CM

## 2019-03-04 DIAGNOSIS — R07.2 PRECORDIAL PAIN: Primary | ICD-10-CM

## 2019-03-04 PROCEDURE — 99204 OFFICE O/P NEW MOD 45 MIN: CPT | Performed by: INTERNAL MEDICINE

## 2019-03-04 RX ORDER — TORSEMIDE 5 MG/1
5 TABLET ORAL DAILY
Refills: 5 | COMMUNITY
Start: 2019-02-26

## 2019-03-04 RX ORDER — LABETALOL 200 MG/1
200 TABLET, FILM COATED ORAL 2 TIMES DAILY
Qty: 60 TABLET | Refills: 6 | Status: SHIPPED | OUTPATIENT
Start: 2019-03-04

## 2019-03-04 RX ORDER — BROMFENAC SODIUM 0.7 MG/ML
SOLUTION/ DROPS OPHTHALMIC
Refills: 1 | COMMUNITY
Start: 2018-12-28 | End: 2019-03-04

## 2019-03-04 RX ORDER — DILTIAZEM HYDROCHLORIDE 240 MG/1
120 CAPSULE, EXTENDED RELEASE ORAL
Refills: 5 | COMMUNITY
Start: 2019-02-26

## 2019-03-04 NOTE — PROGRESS NOTES
Subjective:        [facility]  Cardiology Consult Note    Patient Identification:  Name: Loren Buenrostro  Age: 78 y.o.  Sex: female  :  1940  MRN: [id number]             CC  CHEST PAIN, SHARP    FLUTTERING    ABNORMAL EKG    BP VERY HIGH      History of Present Illness:   78-year-old female here for the cardiac evaluation, as well as establishment of the care has been complaining of sharp left-sided chest pain moderate in intensity, associated with a fluttering    Patient was also found to have abnormal EKG by primary care physician    Patient's has a history of benign essential arterial hypertension and the blood pressure has been running high lately    Comorbid cardiac risk factors:     Past Medical History:  Past Medical History:   Diagnosis Date   • Cardiomegaly    • Coronary artery disease    • Gout    • Hernia    • Herpes    • Hypertension    • Myocardial infarction (CMS/HCC)     2002- stent placed   • Renal disorder      Past Surgical History:  Past Surgical History:   Procedure Laterality Date   • CYST REMOVAL        Allergies:  Allergies   Allergen Reactions   • Sulfa Antibiotics      Home Meds:    (Not in a hospital admission)  Current Meds:   [unfilled]  Social History:   Social History     Tobacco Use   • Smoking status: Former Smoker   Substance Use Topics   • Alcohol use: No      Family History:  History reviewed. No pertinent family history.     Review of Systems    Constitutional: No weakness,fatigue, fever, rigors, chills   Eyes: No vision changes, eye pain   ENT/oropharynx: No difficulty swallowing, sore throat, epistaxis, changes in hearing   Cardiovascular:  Sharp chest pain   Respiratory: No shortness of breath, dyspnea on exertion, cough, wheezing hemoptysis   Gastrointestinal: No abdominal pain, nausea, vomiting, diarrhea, bloody stools   Genitourinary: No hematuria, dysuria   Neurological: No headache, tremors, numbness,  one-sided weakness    Musculoskeletal: No cramps,  myalgias,  joint pain, joint swelling   Integument: No rash, edema           Constitutional:  Heart Rate:  [70] 70  BP: (183)/(77) 183/77    Physical Exam   General:  Appears in no acute distress  Eyes: PERTL,  HEENT:  No JVD. Thyroid not visibly enlarged. No mucosal pallor or cyanosis  Respiratory: Respirations regular and unlabored at rest. BBS with good air entry in all fields. No crackles, rubs or wheezes auscultated  Cardiovascular: S1S2 Regular rate and rhythm. No murmur, rub or gallop auscultated. No carotid bruits. DP/PT pulses    . No pretibial pitting edema  Gastrointestinal: Abdomen soft, flat, non tender. Bowel sounds present. No hepatosplenomegaly. No ascites  Musculoskeletal: VAUGHN x4. No abnormal movements  Extremities: No digital clubbing or cyanosis  Skin: Color pink. Skin warm and dry to touch. No rashes  No xanthoma  Neuro: AAO x3 CN II-XII grossly intact          Procedures    NSR WITH NON SP ST-T CHANGES PERSONALLY REVIEWED    Cardiographics  ECG:     Telemetry:    Echocardiogram:     Imaging  Chest X-ray:     Lab Review               @[email or web address]@              Assessment:/ Recommendations / Plan:   Patient Active Problem List   Diagnosis   • Hypertensive crisis   • Parotiditis   • IZA (acute kidney injury) (CMS/HCC)   • Localized edema                    ICD-10-CM ICD-9-CM   1. Precordial pain R07.2 786.51   2. Abnormal electrocardiogram R94.31 794.31   3. Hypertension, unspecified type I10 401.9     1. Precordial pain  Considering the patient's symptoms as well as clinical situation and  EKG findings, along with cardiac risk factors, ischemic workup is necessary to rule out ischemic cardiomyopathy, stress induced arrhythmias, and functional capacity for diagnosis as well as prognostic consideration    - Stress Test With Myocardial Perfusion One Day  - Adult Transthoracic Echo Complete W/ Cont if Necessary Per Protocol    2. Abnormal electrocardiogram  Considering patient's medical condition as  well as the risk factors, patient will require echocardiogram for further evaluation for the LV function, four-chamber evaluation, including the pressures, valvular function and  pericardial disease and pericardial effusion    - Stress Test With Myocardial Perfusion One Day  - Adult Transthoracic Echo Complete W/ Cont if Necessary Per Protocol    3. Hypertension, unspecified type  Importance of controlling hypertension and blood pressure  checkup on the regular basis has been explained  Hypertension as a silent killer has been discussed  Risk reduction of the weight and regular exercises to control the hypertension has been explained    - Stress Test With Myocardial Perfusion One Day  - Adult Transthoracic Echo Complete W/ Cont if Necessary Per Protocol       DC COREG    LABETOLOL  200 MG PO BID    Pros and cons of this new medication / change medication has been explained to  the patient    Possible side effects has been explained    Associated need of the blood  Work has been explained    Need for the compliance of the medication has been explained      LEXISCAN, ECHO    SEE IN 2-4 WKS    Labs/tests ordered for am      Pam Srinivasan MD  3/4/2019, 12:06 PM      EMR Dragon/Transcription:   Dictated utilizing Dragon dictation

## 2019-03-21 ENCOUNTER — TELEPHONE (OUTPATIENT)
Dept: CARDIOLOGY | Facility: CLINIC | Age: 79
End: 2019-03-21

## 2019-03-21 NOTE — TELEPHONE ENCOUNTER
Labetalol not helping per pt    Called pt she states she will call me back later today to discuss, she is on her way to her eye doctor.    Per pt he bp running 172/86  62, 187/90  65, today 142/74  61.  Pt states she is having some swelling in legs and feet     Per Dr Srinivasan take 200 mg tid  Pt verbalized understanding

## 2019-03-27 ENCOUNTER — APPOINTMENT (OUTPATIENT)
Dept: CARDIOLOGY | Facility: HOSPITAL | Age: 79
End: 2019-03-27

## 2019-04-03 ENCOUNTER — HOSPITAL ENCOUNTER (OUTPATIENT)
Dept: CARDIOLOGY | Facility: HOSPITAL | Age: 79
Discharge: HOME | End: 2019-04-03
Admitting: INTERNAL MEDICINE

## 2019-04-03 VITALS
HEART RATE: 70 BPM | HEIGHT: 61 IN | BODY MASS INDEX: 30.4 KG/M2 | SYSTOLIC BLOOD PRESSURE: 160 MMHG | DIASTOLIC BLOOD PRESSURE: 91 MMHG | WEIGHT: 161 LBS

## 2019-04-03 LAB
BH CV ECHO MEAS - ACS: 1.9 CM
BH CV ECHO MEAS - AO MAX PG (FULL): 4.2 MMHG
BH CV ECHO MEAS - AO MAX PG: 9.1 MMHG
BH CV ECHO MEAS - AO MEAN PG (FULL): 2 MMHG
BH CV ECHO MEAS - AO MEAN PG: 5 MMHG
BH CV ECHO MEAS - AO ROOT AREA (BSA CORRECTED): 1.6
BH CV ECHO MEAS - AO ROOT AREA: 6.6 CM^2
BH CV ECHO MEAS - AO ROOT DIAM: 2.9 CM
BH CV ECHO MEAS - AO V2 MAX: 151 CM/SEC
BH CV ECHO MEAS - AO V2 MEAN: 111 CM/SEC
BH CV ECHO MEAS - AO V2 VTI: 36.7 CM
BH CV ECHO MEAS - AVA(I,A): 2.3 CM^2
BH CV ECHO MEAS - AVA(I,D): 2.3 CM^2
BH CV ECHO MEAS - AVA(V,A): 2.3 CM^2
BH CV ECHO MEAS - AVA(V,D): 2.3 CM^2
BH CV ECHO MEAS - BSA(HAYCOCK): 1.8 M^2
BH CV ECHO MEAS - BSA: 1.8 M^2
BH CV ECHO MEAS - BZI_BMI: 28.5 KILOGRAMS/M^2
BH CV ECHO MEAS - BZI_METRIC_HEIGHT: 160 CM
BH CV ECHO MEAS - BZI_METRIC_WEIGHT: 73 KG
BH CV ECHO MEAS - EDV(CUBED): 39.3 ML
BH CV ECHO MEAS - EDV(MOD-SP2): 37 ML
BH CV ECHO MEAS - EDV(MOD-SP4): 76 ML
BH CV ECHO MEAS - EDV(TEICH): 47.4 ML
BH CV ECHO MEAS - EF(CUBED): 72.9 %
BH CV ECHO MEAS - EF(MOD-BP): 67 %
BH CV ECHO MEAS - EF(MOD-SP2): 67.6 %
BH CV ECHO MEAS - EF(MOD-SP4): 68.4 %
BH CV ECHO MEAS - EF(TEICH): 65.8 %
BH CV ECHO MEAS - ESV(CUBED): 10.6 ML
BH CV ECHO MEAS - ESV(MOD-SP2): 12 ML
BH CV ECHO MEAS - ESV(MOD-SP4): 24 ML
BH CV ECHO MEAS - ESV(TEICH): 16.2 ML
BH CV ECHO MEAS - FS: 35.3 %
BH CV ECHO MEAS - IVS/LVPW: 1.1
BH CV ECHO MEAS - IVSD: 1.3 CM
BH CV ECHO MEAS - LA DIMENSION: 4.1 CM
BH CV ECHO MEAS - LA/AO: 1.4
BH CV ECHO MEAS - LAT PEAK E' VEL: 6.6 CM/SEC
BH CV ECHO MEAS - LV DIASTOLIC VOL/BSA (35-75): 43.1 ML/M^2
BH CV ECHO MEAS - LV MASS(C)D: 138.8 GRAMS
BH CV ECHO MEAS - LV MASS(C)DI: 78.7 GRAMS/M^2
BH CV ECHO MEAS - LV MAX PG: 4.9 MMHG
BH CV ECHO MEAS - LV MEAN PG: 3 MMHG
BH CV ECHO MEAS - LV SYSTOLIC VOL/BSA (12-30): 13.6 ML/M^2
BH CV ECHO MEAS - LV V1 MAX: 111 CM/SEC
BH CV ECHO MEAS - LV V1 MEAN: 80.2 CM/SEC
BH CV ECHO MEAS - LV V1 VTI: 27 CM
BH CV ECHO MEAS - LVIDD: 3.4 CM
BH CV ECHO MEAS - LVIDS: 2.2 CM
BH CV ECHO MEAS - LVLD AP2: 7.4 CM
BH CV ECHO MEAS - LVLD AP4: 7.6 CM
BH CV ECHO MEAS - LVLS AP2: 5.9 CM
BH CV ECHO MEAS - LVLS AP4: 6.7 CM
BH CV ECHO MEAS - LVOT AREA (M): 3.1 CM^2
BH CV ECHO MEAS - LVOT AREA: 3.1 CM^2
BH CV ECHO MEAS - LVOT DIAM: 2 CM
BH CV ECHO MEAS - LVPWD: 1.2 CM
BH CV ECHO MEAS - MED PEAK E' VEL: 7.7 CM/SEC
BH CV ECHO MEAS - MV A DUR: 0.18 SEC
BH CV ECHO MEAS - MV A MAX VEL: 130 CM/SEC
BH CV ECHO MEAS - MV DEC SLOPE: 381 CM/SEC^2
BH CV ECHO MEAS - MV DEC TIME: 0.21 SEC
BH CV ECHO MEAS - MV E MAX VEL: 82.9 CM/SEC
BH CV ECHO MEAS - MV E/A: 0.64
BH CV ECHO MEAS - MV MAX PG: 6.7 MMHG
BH CV ECHO MEAS - MV MEAN PG: 2 MMHG
BH CV ECHO MEAS - MV P1/2T MAX VEL: 102 CM/SEC
BH CV ECHO MEAS - MV P1/2T: 78.4 MSEC
BH CV ECHO MEAS - MV V2 MAX: 129 CM/SEC
BH CV ECHO MEAS - MV V2 MEAN: 64.8 CM/SEC
BH CV ECHO MEAS - MV V2 VTI: 31 CM
BH CV ECHO MEAS - MVA P1/2T LCG: 2.2 CM^2
BH CV ECHO MEAS - MVA(P1/2T): 2.8 CM^2
BH CV ECHO MEAS - MVA(VTI): 2.7 CM^2
BH CV ECHO MEAS - PA ACC TIME: 0.1 SEC
BH CV ECHO MEAS - PA MAX PG (FULL): 2 MMHG
BH CV ECHO MEAS - PA MAX PG: 4.6 MMHG
BH CV ECHO MEAS - PA PR(ACCEL): 33.1 MMHG
BH CV ECHO MEAS - PA V2 MAX: 107 CM/SEC
BH CV ECHO MEAS - PI END-D VEL: 121 CM/SEC
BH CV ECHO MEAS - PULM A REVS DUR: 0.2 SEC
BH CV ECHO MEAS - PULM A REVS VEL: 24.8 CM/SEC
BH CV ECHO MEAS - PULM DIAS VEL: 29.9 CM/SEC
BH CV ECHO MEAS - PULM S/D: 1.4
BH CV ECHO MEAS - PULM SYS VEL: 41.1 CM/SEC
BH CV ECHO MEAS - PVA(V,A): 2.3 CM^2
BH CV ECHO MEAS - PVA(V,D): 2.3 CM^2
BH CV ECHO MEAS - QP/QS: 0.86
BH CV ECHO MEAS - RAP SYSTOLE: 3 MMHG
BH CV ECHO MEAS - RV MAX PG: 2.5 MMHG
BH CV ECHO MEAS - RV MEAN PG: 2 MMHG
BH CV ECHO MEAS - RV V1 MAX: 79.8 CM/SEC
BH CV ECHO MEAS - RV V1 MEAN: 64.1 CM/SEC
BH CV ECHO MEAS - RV V1 VTI: 23.2 CM
BH CV ECHO MEAS - RVDD: 2.5 CM
BH CV ECHO MEAS - RVOT AREA: 3.1 CM^2
BH CV ECHO MEAS - RVOT DIAM: 2 CM
BH CV ECHO MEAS - RVSP: 22.9 MMHG
BH CV ECHO MEAS - SI(AO): 137.5 ML/M^2
BH CV ECHO MEAS - SI(CUBED): 16.2 ML/M^2
BH CV ECHO MEAS - SI(LVOT): 48.1 ML/M^2
BH CV ECHO MEAS - SI(MOD-SP2): 14.2 ML/M^2
BH CV ECHO MEAS - SI(MOD-SP4): 29.5 ML/M^2
BH CV ECHO MEAS - SI(TEICH): 17.7 ML/M^2
BH CV ECHO MEAS - SV(AO): 242.4 ML
BH CV ECHO MEAS - SV(CUBED): 28.7 ML
BH CV ECHO MEAS - SV(LVOT): 84.8 ML
BH CV ECHO MEAS - SV(MOD-SP2): 25 ML
BH CV ECHO MEAS - SV(MOD-SP4): 52 ML
BH CV ECHO MEAS - SV(RVOT): 72.9 ML
BH CV ECHO MEAS - SV(TEICH): 31.2 ML
BH CV ECHO MEAS - TAPSE (>1.6): 2.3 CM2
BH CV ECHO MEAS - TR MAX VEL: 223 CM/SEC
BH CV ECHO MEASUREMENTS AVERAGE E/E' RATIO: 11.59
BH CV XLRA - RV BASE: 12.8 CM
BH CV XLRA - RV LENGTH: 5.8 CM
BH CV XLRA - RV MID: 2.8 CM
BH CV XLRA - TDI S': 3.5 CM/SEC
LEFT ATRIUM VOLUME INDEX: 26 ML/M2
MAXIMAL PREDICTED HEART RATE: 143 BPM
STRESS TARGET HR: 122 BPM

## 2019-04-03 PROCEDURE — 93306 TTE W/DOPPLER COMPLETE: CPT | Performed by: INTERNAL MEDICINE

## 2019-04-03 PROCEDURE — 93306 TTE W/DOPPLER COMPLETE: CPT

## 2019-04-05 ENCOUNTER — TELEPHONE (OUTPATIENT)
Dept: CARDIOLOGY | Facility: CLINIC | Age: 79
End: 2019-04-05

## 2019-04-05 NOTE — TELEPHONE ENCOUNTER
"Pt Called states the Labatolol 200 mg is not working and she feels \" drained\"  BP yesterday was 178/84  Hr 64  This morning 163/74  74    Coreg was D/C on 3/4/19 and Labatalol 200 mg bid was added.     Was increased to 200 mg tid per [facility] on 3/21/19     Pt states her PCP gave her Hydralazine 25 mg bid on 4/4/19.      Per Soha KRAUS pt to take BP 2 hours after taking BP meds to see if bp is dropping.    Ptstates she will decrease labatolol back to bid due to she is taking Hydralazine to see if needs to let hydralazine start working.    Instructed pt to keep check on bp and call me Monday to let me know if getting any better. If gets any worse over weekend go to ER and/or call office  "

## 2019-04-09 ENCOUNTER — APPOINTMENT (OUTPATIENT)
Dept: GENERAL RADIOLOGY | Facility: HOSPITAL | Age: 79
End: 2019-04-09

## 2019-04-09 ENCOUNTER — APPOINTMENT (OUTPATIENT)
Dept: CT IMAGING | Facility: HOSPITAL | Age: 79
End: 2019-04-09

## 2019-04-09 ENCOUNTER — HOSPITAL ENCOUNTER (EMERGENCY)
Facility: HOSPITAL | Age: 79
Discharge: HOME | End: 2019-04-09
Attending: EMERGENCY MEDICINE | Admitting: EMERGENCY MEDICINE

## 2019-04-09 VITALS
SYSTOLIC BLOOD PRESSURE: 169 MMHG | RESPIRATION RATE: 18 BRPM | WEIGHT: 161.6 LBS | TEMPERATURE: 98.3 F | HEART RATE: 79 BPM | OXYGEN SATURATION: 99 % | DIASTOLIC BLOOD PRESSURE: 92 MMHG | BODY MASS INDEX: 31.73 KG/M2 | HEIGHT: 60 IN

## 2019-04-09 DIAGNOSIS — R51.9 ACUTE NONINTRACTABLE HEADACHE, UNSPECIFIED HEADACHE TYPE: ICD-10-CM

## 2019-04-09 DIAGNOSIS — E87.6 HYPOKALEMIA: ICD-10-CM

## 2019-04-09 DIAGNOSIS — I10 ESSENTIAL HYPERTENSION: Primary | ICD-10-CM

## 2019-04-09 LAB
ALBUMIN SERPL-MCNC: 4.2 G/DL (ref 3.5–5.2)
ALBUMIN/GLOB SERPL: 1.3 G/DL
ALP SERPL-CCNC: 82 U/L (ref 39–117)
ALT SERPL W P-5'-P-CCNC: 14 U/L (ref 1–33)
ANION GAP SERPL CALCULATED.3IONS-SCNC: 15 MMOL/L
AST SERPL-CCNC: 15 U/L (ref 1–32)
BASOPHILS # BLD AUTO: 0.03 10*3/MM3 (ref 0–0.2)
BASOPHILS NFR BLD AUTO: 0.7 % (ref 0–1.5)
BILIRUB SERPL-MCNC: 0.3 MG/DL (ref 0.2–1.2)
BUN BLD-MCNC: 13 MG/DL (ref 8–23)
BUN/CREAT SERPL: 9.6 (ref 7–25)
CALCIUM SPEC-SCNC: 10.6 MG/DL (ref 8.6–10.5)
CHLORIDE SERPL-SCNC: 101 MMOL/L (ref 98–107)
CO2 SERPL-SCNC: 26 MMOL/L (ref 22–29)
CREAT BLD-MCNC: 1.36 MG/DL (ref 0.57–1)
DEPRECATED RDW RBC AUTO: 44 FL (ref 37–54)
EOSINOPHIL # BLD AUTO: 0.11 10*3/MM3 (ref 0–0.4)
EOSINOPHIL NFR BLD AUTO: 2.6 % (ref 0.3–6.2)
ERYTHROCYTE [DISTWIDTH] IN BLOOD BY AUTOMATED COUNT: 15.1 % (ref 12.3–15.4)
GFR SERPL CREATININE-BSD FRML MDRD: 46 ML/MIN/1.73
GLOBULIN UR ELPH-MCNC: 3.2 GM/DL
GLUCOSE BLD-MCNC: 107 MG/DL (ref 65–99)
HCT VFR BLD AUTO: 40.2 % (ref 34–46.6)
HGB BLD-MCNC: 13.3 G/DL (ref 12–15.9)
HOLD SPECIMEN: NORMAL
HOLD SPECIMEN: NORMAL
IMM GRANULOCYTES # BLD AUTO: 0.01 10*3/MM3 (ref 0–0.05)
IMM GRANULOCYTES NFR BLD AUTO: 0.2 % (ref 0–0.5)
INR PPP: 0.99 (ref 0.9–1.1)
LYMPHOCYTES # BLD AUTO: 1.22 10*3/MM3 (ref 0.7–3.1)
LYMPHOCYTES NFR BLD AUTO: 29.3 % (ref 19.6–45.3)
MCH RBC QN AUTO: 26.7 PG (ref 26.6–33)
MCHC RBC AUTO-ENTMCNC: 33.1 G/DL (ref 31.5–35.7)
MCV RBC AUTO: 80.7 FL (ref 79–97)
MONOCYTES # BLD AUTO: 0.48 10*3/MM3 (ref 0.1–0.9)
MONOCYTES NFR BLD AUTO: 11.5 % (ref 5–12)
NEUTROPHILS # BLD AUTO: 2.31 10*3/MM3 (ref 1.4–7)
NEUTROPHILS NFR BLD AUTO: 55.7 % (ref 42.7–76)
NRBC BLD AUTO-RTO: 0 /100 WBC (ref 0–0)
NT-PROBNP SERPL-MCNC: 420.1 PG/ML (ref 5–1800)
PLATELET # BLD AUTO: 224 10*3/MM3 (ref 140–450)
PMV BLD AUTO: 11 FL (ref 6–12)
POTASSIUM BLD-SCNC: 2.7 MMOL/L (ref 3.5–5.2)
PROT SERPL-MCNC: 7.4 G/DL (ref 6–8.5)
PROTHROMBIN TIME: 12.8 SECONDS (ref 11.7–14.2)
RBC # BLD AUTO: 4.98 10*6/MM3 (ref 3.77–5.28)
SODIUM BLD-SCNC: 142 MMOL/L (ref 136–145)
TROPONIN T SERPL-MCNC: <0.01 NG/ML (ref 0–0.03)
WBC NRBC COR # BLD: 4.16 10*3/MM3 (ref 3.4–10.8)
WHOLE BLOOD HOLD SPECIMEN: NORMAL
WHOLE BLOOD HOLD SPECIMEN: NORMAL

## 2019-04-09 PROCEDURE — 93005 ELECTROCARDIOGRAM TRACING: CPT | Performed by: EMERGENCY MEDICINE

## 2019-04-09 PROCEDURE — 99284 EMERGENCY DEPT VISIT MOD MDM: CPT

## 2019-04-09 PROCEDURE — 70450 CT HEAD/BRAIN W/O DYE: CPT

## 2019-04-09 PROCEDURE — 83880 ASSAY OF NATRIURETIC PEPTIDE: CPT | Performed by: EMERGENCY MEDICINE

## 2019-04-09 PROCEDURE — 71046 X-RAY EXAM CHEST 2 VIEWS: CPT

## 2019-04-09 PROCEDURE — 84484 ASSAY OF TROPONIN QUANT: CPT | Performed by: EMERGENCY MEDICINE

## 2019-04-09 PROCEDURE — 85025 COMPLETE CBC W/AUTO DIFF WBC: CPT | Performed by: EMERGENCY MEDICINE

## 2019-04-09 PROCEDURE — 96374 THER/PROPH/DIAG INJ IV PUSH: CPT

## 2019-04-09 PROCEDURE — 25010000002 DIPHENHYDRAMINE PER 50 MG: Performed by: EMERGENCY MEDICINE

## 2019-04-09 PROCEDURE — 85610 PROTHROMBIN TIME: CPT | Performed by: EMERGENCY MEDICINE

## 2019-04-09 PROCEDURE — 96375 TX/PRO/DX INJ NEW DRUG ADDON: CPT

## 2019-04-09 PROCEDURE — 93010 ELECTROCARDIOGRAM REPORT: CPT | Performed by: INTERNAL MEDICINE

## 2019-04-09 PROCEDURE — 25010000002 METOCLOPRAMIDE PER 10 MG: Performed by: EMERGENCY MEDICINE

## 2019-04-09 PROCEDURE — 80053 COMPREHEN METABOLIC PANEL: CPT | Performed by: EMERGENCY MEDICINE

## 2019-04-09 RX ORDER — DIPHENHYDRAMINE HYDROCHLORIDE 50 MG/ML
25 INJECTION INTRAMUSCULAR; INTRAVENOUS ONCE
Status: COMPLETED | OUTPATIENT
Start: 2019-04-09 | End: 2019-04-09

## 2019-04-09 RX ORDER — POTASSIUM CHLORIDE 750 MG/1
40 CAPSULE, EXTENDED RELEASE ORAL ONCE
Status: COMPLETED | OUTPATIENT
Start: 2019-04-09 | End: 2019-04-09

## 2019-04-09 RX ORDER — POTASSIUM CHLORIDE 750 MG/1
10 TABLET, FILM COATED, EXTENDED RELEASE ORAL 2 TIMES DAILY
Qty: 14 TABLET | Refills: 0 | Status: SHIPPED | OUTPATIENT
Start: 2019-04-09 | End: 2019-04-09 | Stop reason: SDUPTHER

## 2019-04-09 RX ORDER — HYDRALAZINE HYDROCHLORIDE 25 MG/1
25 TABLET, FILM COATED ORAL 3 TIMES DAILY
COMMUNITY

## 2019-04-09 RX ORDER — RANITIDINE HCL 75 MG
75 TABLET ORAL DAILY
COMMUNITY
End: 2021-01-22 | Stop reason: HOSPADM

## 2019-04-09 RX ORDER — METOCLOPRAMIDE HYDROCHLORIDE 5 MG/ML
10 INJECTION INTRAMUSCULAR; INTRAVENOUS ONCE
Status: COMPLETED | OUTPATIENT
Start: 2019-04-09 | End: 2019-04-09

## 2019-04-09 RX ORDER — POTASSIUM CHLORIDE 750 MG/1
10 TABLET, FILM COATED, EXTENDED RELEASE ORAL 2 TIMES DAILY
Qty: 14 TABLET | Refills: 0 | Status: SHIPPED | OUTPATIENT
Start: 2019-04-09

## 2019-04-09 RX ORDER — TIMOLOL MALEATE 5 MG/ML
1 SOLUTION/ DROPS OPHTHALMIC DAILY
COMMUNITY

## 2019-04-09 RX ORDER — SODIUM CHLORIDE 0.9 % (FLUSH) 0.9 %
10 SYRINGE (ML) INJECTION AS NEEDED
Status: DISCONTINUED | OUTPATIENT
Start: 2019-04-09 | End: 2019-04-09 | Stop reason: HOSPADM

## 2019-04-09 RX ADMIN — POTASSIUM CHLORIDE 40 MEQ: 750 CAPSULE, EXTENDED RELEASE ORAL at 06:26

## 2019-04-09 RX ADMIN — DIPHENHYDRAMINE HYDROCHLORIDE 25 MG: 50 INJECTION, SOLUTION INTRAMUSCULAR; INTRAVENOUS at 07:43

## 2019-04-09 RX ADMIN — METOCLOPRAMIDE 10 MG: 5 INJECTION, SOLUTION INTRAMUSCULAR; INTRAVENOUS at 07:43

## 2019-04-09 NOTE — DISCHARGE INSTRUCTIONS
You are advised to follow closely with Dr Escalante and Dr Srinivasan in 2-3 days for recheck, blood pressure recheck, follow up of low potassium levels, final results of lab work and imaging testing, and further testing/treatment as needed.    Low sodium diet  Take hydralazine 3 times daily as previously directed  Elevate legs while sitting  Compression stockings while upright  Eat foods high in potassium in your daily diet  Melatonin 0.3 to 5mg nightly    Please return to the emergency department immediately with chest pain different than usual for you, shortness of air, abdominal pain, persistent vomiting/fever, blood in emesis or stool, lightheadedness/fainting, problems with speech, one sided weakness/numbness, new incontinence, problems with vision, altered mental status or for worsening of symptoms or other concerns.

## 2019-04-09 NOTE — ED NOTES
MD notified of Celina Devi RN  04/09/19 0442    
Pt took 25mg Hydralazine per home pill bottle.      Celina Salomon, RN  04/09/19 0603    
Pt via PV with c/o of high blood pressure and SOA off and on for the past week.    197/97 at home   Hx of HTN     Wilber, Tana, CHIVO  04/09/19 9418    
(0) independent

## 2019-04-09 NOTE — ED PROVIDER NOTES
EMERGENCY DEPARTMENT ENCOUNTER    CHIEF COMPLAINT  Chief Complaint: Shortness of air   History given by: patient   History limited by: n/a  Room Number: 13/13  PMD: Katherine Escalante MD Dr Chandiramani, cardiology    HPI:  Pt is a 78 y.o. female who presents complaining of concern about her blood pressure being high with SOA that has been intermittent for the past week. Pt states that the SOA only occurs at night, but will occur when she is supine or sitting up.  She also complains of left anterior chest pain, described as aching, that has been intermittent for a while, most recently occurred 2 nights ago. She also complains of tremors in her BLE, which she states has increased. Pt also complains of edema to her ankles and face that began after started on Labetalol 1 month ago. Pt also complains of left eye pain similar to previous.  She reports a hx of glaucoma, and reports compliance with her eye drops.     Duration:  1 week  Onset: gradual  Timing: intermittent   Location: respiratory   Radiation: none  Quality: SOA  Intensity/Severity: moderate  Progression: unchanged  Associated Symptoms: CP, tremors, BLE edema  Aggravating Factors: present at night  Alleviating Factors: none    PAST MEDICAL HISTORY  Active Ambulatory Problems     Diagnosis Date Noted   • Hypertensive crisis 09/22/2017   • Parotiditis 09/23/2017   • IZA (acute kidney injury) (CMS/HCC) 09/23/2017   • Localized edema 09/23/2017     Resolved Ambulatory Problems     Diagnosis Date Noted   • No Resolved Ambulatory Problems     Past Medical History:   Diagnosis Date   • Cardiomegaly    • Coronary artery disease    • Gout    • Hernia    • Herpes    • Hypertension    • Myocardial infarction (CMS/HCC)    • Renal disorder        PAST SURGICAL HISTORY  Past Surgical History:   Procedure Laterality Date   • CYST REMOVAL         FAMILY HISTORY  History reviewed. No pertinent family history.    SOCIAL HISTORY  Social History     Socioeconomic History   •  Marital status:      Spouse name: Not on file   • Number of children: Not on file   • Years of education: Not on file   • Highest education level: Not on file   Tobacco Use   • Smoking status: Former Smoker   Substance and Sexual Activity   • Alcohol use: No   • Drug use: No   • Sexual activity: Defer       ALLERGIES  Sulfa antibiotics    REVIEW OF SYSTEMS  Review of Systems   Constitutional: Negative for chills and fever.   HENT: Negative for rhinorrhea and sore throat.    Eyes: Positive for pain (left). Negative for visual disturbance.   Respiratory: Positive for shortness of breath. Negative for cough.    Cardiovascular: Positive for chest pain and leg swelling. Negative for palpitations.   Gastrointestinal: Negative for abdominal pain, diarrhea and vomiting.   Endocrine: Negative.    Genitourinary: Negative for decreased urine volume, dysuria and frequency.   Musculoskeletal: Negative for neck pain.   Skin: Negative for rash.   Neurological: Positive for tremors. Negative for syncope and headaches.   Psychiatric/Behavioral: Negative.    All other systems reviewed and are negative.      PHYSICAL EXAM  ED Triage Vitals   Temp Heart Rate Resp BP SpO2   04/09/19 0415 04/09/19 0415 04/09/19 0415 04/09/19 0425 04/09/19 0415   98.5 °F (36.9 °C) 85 20 (!) 206/100 100 %      Temp src Heart Rate Source Patient Position BP Location FiO2 (%)   04/09/19 0415 -- -- -- --   Tympanic           Physical Exam   Constitutional: She is oriented to person, place, and time.  Non-toxic appearance. She appears distressed (mild).   HENT:   Head: Normocephalic and atraumatic.   Eyes: EOM are normal.   Neck: Normal range of motion. Neck supple.   Cardiovascular: Normal rate, regular rhythm, normal heart sounds and intact distal pulses.   No murmur heard.  Pulses:       Posterior tibial pulses are 2+ on the right side, and 2+ on the left side.   Pulmonary/Chest: Effort normal and breath sounds normal. No respiratory distress.    Abdominal: Soft. Bowel sounds are normal. There is no tenderness. There is no rebound and no guarding.   Musculoskeletal: Normal range of motion. She exhibits edema (trace edema BLE).   Neurological: She is alert and oriented to person, place, and time. She has normal strength.   Skin: Skin is warm and dry.   Psychiatric: Affect normal.   Nursing note and vitals reviewed.      LAB RESULTS  Lab Results (last 24 hours)     Procedure Component Value Units Date/Time    CBC & Differential [[id number]] Collected:  04/09/19 0437    Specimen:  Blood Updated:  04/09/19 0507    Narrative:       The following orders were created for panel order CBC & Differential.  Procedure                               Abnormality         Status                     ---------                               -----------         ------                     CBC Auto Differential[[id number]]        Normal              Final result                 Please view results for these tests on the individual orders.    Comprehensive Metabolic Panel [[id number]]  (Abnormal) Collected:  04/09/19 0437    Specimen:  Blood Updated:  04/09/19 0513     Glucose 107 mg/dL      BUN 13 mg/dL      Creatinine 1.36 mg/dL      Sodium 142 mmol/L      Potassium 2.7 mmol/L      Chloride 101 mmol/L      CO2 26.0 mmol/L      Calcium 10.6 mg/dL      Total Protein 7.4 g/dL      Albumin 4.20 g/dL      ALT (SGPT) 14 U/L      AST (SGOT) 15 U/L      Alkaline Phosphatase 82 U/L      Total Bilirubin 0.3 mg/dL      eGFR  African Amer 46 mL/min/1.73      Globulin 3.2 gm/dL      A/G Ratio 1.3 g/dL      BUN/Creatinine Ratio 9.6     Anion Gap 15.0 mmol/L     Narrative:       GFR Normal >60  Chronic Kidney Disease <60  Kidney Failure <15    BNP [[id number]]  (Normal) Collected:  04/09/19 0437    Specimen:  Blood Updated:  04/09/19 0508     proBNP 420.1 pg/mL     Narrative:       Among patients with dyspnea, NT-proBNP is highly sensitive for the detection of acute congestive heart failure. In  addition NT-proBNP of <300 pg/ml effectively rules out acute congestive heart failure with 99% negative predictive value.    Troponin [[id number]]  (Normal) Collected:  04/09/19 0437    Specimen:  Blood Updated:  04/09/19 0508     Troponin T <0.010 ng/mL     Narrative:       Troponin T Reference Range:  <= 0.03 ng/mL-   Negative for AMI  >0.03 ng/mL-     Abnormal for myocardial necrosis.  Clinicians would have to utilize clinical acumen, EKG, Troponin and serial changes to determine if it is an Acute Myocardial Infarction or myocardial injury due to an underlying chronic condition.     Protime-INR [[id number]]  (Normal) Collected:  04/09/19 0437    Specimen:  Blood Updated:  04/09/19 0514     Protime 12.8 Seconds      INR 0.99    CBC Auto Differential [[id number]]  (Normal) Collected:  04/09/19 0437    Specimen:  Blood Updated:  04/09/19 0507     WBC 4.16 10*3/mm3      RBC 4.98 10*6/mm3      Hemoglobin 13.3 g/dL      Hematocrit 40.2 %      MCV 80.7 fL      MCH 26.7 pg      MCHC 33.1 g/dL      RDW 15.1 %      RDW-SD 44.0 fl      MPV 11.0 fL      Platelets 224 10*3/mm3      Neutrophil % 55.7 %      Lymphocyte % 29.3 %      Monocyte % 11.5 %      Eosinophil % 2.6 %      Basophil % 0.7 %      Immature Grans % 0.2 %      Neutrophils, Absolute 2.31 10*3/mm3      Lymphocytes, Absolute 1.22 10*3/mm3      Monocytes, Absolute 0.48 10*3/mm3      Eosinophils, Absolute 0.11 10*3/mm3      Basophils, Absolute 0.03 10*3/mm3      Immature Grans, Absolute 0.01 10*3/mm3      nRBC 0.0 /100 WBC           I ordered the above labs and reviewed the results    RADIOLOGY  XR Chest 2 View   Final Result   1. No active disease.       This report was finalized on 4/9/2019 5:38 AM by Freddie Spivey M.D.          CT Head Without Contrast    (Results Pending)        I ordered the above noted radiological studies. Interpreted by radiologist. Reviewed by me in PACS.       PROCEDURES  Procedures    EKG          EKG time: 04;39  Rhythm/Rate: NSR 60's  P  waves and ID: nml  QRS, axis: LVH  ST and T waves: non specific ST findings in inferior leads     Interpreted Contemporaneously by me, independently viewed  unchanged compared to prior 9/22/17    PROGRESS AND CONSULTS       04:29  CXR, CMP, CBC, BNP, troponin, PT/INR, and EKG ordered.     05;12  BP- (!) 206/100 HR- 85 Temp- 98.5 °F (36.9 °C) (Tympanic) O2 sat- 100%  Informed pt that the EKG shows NAD. Awaiting labs and CXR. Will have pt take a dose of her home hydralazine to treat hypertension. understands and agrees with the plan, all questions answered.    05:46  Potassium ordered to treat hypokalemia.     06:35  Pt instructed to take home dose of Diltiazem and Furosamide    Pt in Ed feels better, encouraged to take hydralazine 3 times daily with other meds as previous and follow closely with her PCP, Dr Srinivasan, and her ophthalmologist this week for recheck and further testing/treatment as needed.     MEDICAL DECISION MAKING  Results were reviewed/discussed with the patient and they were also made aware of online access. Pt also made aware that some labs, such as cultures, will not be resulted during ER visit and follow up with PMD is necessary.     MDM  Number of Diagnoses or Management Options     Amount and/or Complexity of Data Reviewed  Clinical lab tests: ordered and reviewed  Tests in the radiology section of CPT®: ordered and reviewed  Tests in the medicine section of CPT®: ordered and reviewed (See EKG procedure note. )  Decide to obtain previous medical records or to obtain history from someone other than the patient: yes  Review and summarize past medical records: yes (Echo on 4/3/19- Left ventricular diastolic dysfunction (grade I) consistent with impaired relaxation. Calculated EF = 67%)    Patient Progress  Patient progress: stable         DIAGNOSIS  Final diagnoses:   Essential hypertension   Acute nonintractable headache, unspecified headache type   Hypokalemia        DISPOSITION  DISCHARGE    Patient discharged in stable condition.    Reviewed implications of results, diagnosis, meds, responsibility to follow up, warning signs and symptoms of possible worsening, potential complications and reasons to return to ER.    Patient/Family voiced understanding of above instructions.    Discussed plan for discharge, as there is no emergent indication for admission. Patient referred to primary care provider for BP management due to today's BP. Pt/family is agreeable and understands need for follow up and repeat testing.  Pt is aware that discharge does not mean that nothing is wrong but it indicates no emergency is present that requires admission and they must continue care with follow-up as given below or physician of their choice.     FOLLOW-UP  Katherine Escalante MD  [address] [facility] [id number] [phone]    Schedule an appointment as soon as possible for a visit in 3 days  EVEN IF WELL    Pam Srinivasan MD  [address] [facility] [id number] [phone]    Schedule an appointment as soon as possible for a visit in 3 days  EVEN IF WELL         Medication List      New Prescriptions    potassium chloride 10 MEQ CR tablet  Commonly known as:  K-DUR  Take 1 tablet by mouth 2 (Two) Times a Day.          Latest Documented Vital Signs:  As of 6:52 AM  BP- 169/92 HR- 69 Temp- 98.5 °F (36.9 °C) (Tympanic) O2 sat- 99%    --  Documentation assistance provided by keven Conrad for Dr Yin.  Information recorded by the scribe was done at my direction and has been verified and validated by me.       Ariadne Conrad  04/09/19 [id number]       Cecilia Yin MD  04/11/19 [id number]

## 2019-04-17 ENCOUNTER — APPOINTMENT (OUTPATIENT)
Dept: CARDIOLOGY | Facility: HOSPITAL | Age: 79
End: 2019-04-17

## 2019-04-17 ENCOUNTER — TELEPHONE (OUTPATIENT)
Dept: CARDIOLOGY | Facility: CLINIC | Age: 79
End: 2019-04-17

## 2019-08-20 RX ORDER — ACYCLOVIR 400 MG/1
400 TABLET ORAL DAILY
Qty: 30 TABLET | Refills: 0 | Status: SHIPPED | OUTPATIENT
Start: 2019-08-20 | End: 2019-09-27 | Stop reason: SDUPTHER

## 2019-09-27 DIAGNOSIS — B02.9 HERPES ZOSTER WITHOUT COMPLICATION: Primary | ICD-10-CM

## 2019-09-27 RX ORDER — ACYCLOVIR 400 MG/1
400 TABLET ORAL DAILY
Qty: 30 TABLET | Refills: 0 | Status: SHIPPED | OUTPATIENT
Start: 2019-09-27 | End: 2019-11-08 | Stop reason: SDUPTHER

## 2019-11-08 DIAGNOSIS — B02.9 HERPES ZOSTER WITHOUT COMPLICATION: ICD-10-CM

## 2019-11-08 RX ORDER — ACYCLOVIR 400 MG/1
400 TABLET ORAL DAILY
Qty: 30 TABLET | Refills: 0 | Status: SHIPPED | OUTPATIENT
Start: 2019-11-08 | End: 2019-12-19 | Stop reason: SDUPTHER

## 2019-12-19 DIAGNOSIS — B02.9 HERPES ZOSTER WITHOUT COMPLICATION: ICD-10-CM

## 2019-12-19 RX ORDER — ACYCLOVIR 400 MG/1
400 TABLET ORAL DAILY
Qty: 30 TABLET | Refills: 0 | Status: SHIPPED | OUTPATIENT
Start: 2019-12-19

## 2020-01-27 DIAGNOSIS — B02.9 HERPES ZOSTER WITHOUT COMPLICATION: ICD-10-CM

## 2020-11-18 ENCOUNTER — APPOINTMENT (OUTPATIENT)
Dept: CT IMAGING | Facility: HOSPITAL | Age: 80
End: 2020-11-18

## 2020-11-18 ENCOUNTER — HOSPITAL ENCOUNTER (EMERGENCY)
Facility: HOSPITAL | Age: 80
Discharge: HOME | End: 2020-11-18
Attending: EMERGENCY MEDICINE | Admitting: EMERGENCY MEDICINE

## 2020-11-18 VITALS
HEART RATE: 77 BPM | TEMPERATURE: 97.6 F | SYSTOLIC BLOOD PRESSURE: 146 MMHG | BODY MASS INDEX: 31.02 KG/M2 | OXYGEN SATURATION: 99 % | RESPIRATION RATE: 14 BRPM | DIASTOLIC BLOOD PRESSURE: 77 MMHG | HEIGHT: 60 IN | WEIGHT: 158 LBS

## 2020-11-18 DIAGNOSIS — N18.9 CHRONIC RENAL IMPAIRMENT, UNSPECIFIED CKD STAGE: ICD-10-CM

## 2020-11-18 DIAGNOSIS — R55 SYNCOPE, UNSPECIFIED SYNCOPE TYPE: Primary | ICD-10-CM

## 2020-11-18 LAB
ALBUMIN SERPL-MCNC: 4.1 G/DL (ref 3.5–5.2)
ALBUMIN/GLOB SERPL: 1.2 G/DL
ALP SERPL-CCNC: 88 U/L (ref 39–117)
ALT SERPL W P-5'-P-CCNC: 17 U/L (ref 1–33)
ANION GAP SERPL CALCULATED.3IONS-SCNC: 7.8 MMOL/L (ref 5–15)
AST SERPL-CCNC: 24 U/L (ref 1–32)
BASOPHILS # BLD AUTO: 0.03 10*3/MM3 (ref 0–0.2)
BASOPHILS NFR BLD AUTO: 0.5 % (ref 0–1.5)
BILIRUB SERPL-MCNC: 0.2 MG/DL (ref 0–1.2)
BUN SERPL-MCNC: 20 MG/DL (ref 8–23)
BUN/CREAT SERPL: 12 (ref 7–25)
CALCIUM SPEC-SCNC: 10.1 MG/DL (ref 8.6–10.5)
CHLORIDE SERPL-SCNC: 104 MMOL/L (ref 98–107)
CO2 SERPL-SCNC: 28.2 MMOL/L (ref 22–29)
CREAT SERPL-MCNC: 1.67 MG/DL (ref 0.57–1)
DEPRECATED RDW RBC AUTO: 40.8 FL (ref 37–54)
EOSINOPHIL # BLD AUTO: 0.04 10*3/MM3 (ref 0–0.4)
EOSINOPHIL NFR BLD AUTO: 0.7 % (ref 0.3–6.2)
ERYTHROCYTE [DISTWIDTH] IN BLOOD BY AUTOMATED COUNT: 13.7 % (ref 12.3–15.4)
GFR SERPL CREATININE-BSD FRML MDRD: 36 ML/MIN/1.73
GLOBULIN UR ELPH-MCNC: 3.3 GM/DL
GLUCOSE SERPL-MCNC: 99 MG/DL (ref 65–99)
HCT VFR BLD AUTO: 45.7 % (ref 34–46.6)
HGB BLD-MCNC: 15 G/DL (ref 12–15.9)
HOLD SPECIMEN: NORMAL
HOLD SPECIMEN: NORMAL
IMM GRANULOCYTES # BLD AUTO: 0.02 10*3/MM3 (ref 0–0.05)
IMM GRANULOCYTES NFR BLD AUTO: 0.3 % (ref 0–0.5)
LYMPHOCYTES # BLD AUTO: 1.18 10*3/MM3 (ref 0.7–3.1)
LYMPHOCYTES NFR BLD AUTO: 19.8 % (ref 19.6–45.3)
MAGNESIUM SERPL-MCNC: 2 MG/DL (ref 1.6–2.4)
MCH RBC QN AUTO: 27.2 PG (ref 26.6–33)
MCHC RBC AUTO-ENTMCNC: 32.8 G/DL (ref 31.5–35.7)
MCV RBC AUTO: 82.8 FL (ref 79–97)
MONOCYTES # BLD AUTO: 0.53 10*3/MM3 (ref 0.1–0.9)
MONOCYTES NFR BLD AUTO: 8.9 % (ref 5–12)
NEUTROPHILS NFR BLD AUTO: 4.15 10*3/MM3 (ref 1.7–7)
NEUTROPHILS NFR BLD AUTO: 69.8 % (ref 42.7–76)
NRBC BLD AUTO-RTO: 0 /100 WBC (ref 0–0.2)
PLATELET # BLD AUTO: 240 10*3/MM3 (ref 140–450)
PMV BLD AUTO: 10.2 FL (ref 6–12)
POTASSIUM SERPL-SCNC: 4.1 MMOL/L (ref 3.5–5.2)
PROT SERPL-MCNC: 7.4 G/DL (ref 6–8.5)
RBC # BLD AUTO: 5.52 10*6/MM3 (ref 3.77–5.28)
SODIUM SERPL-SCNC: 140 MMOL/L (ref 136–145)
TROPONIN T SERPL-MCNC: <0.01 NG/ML (ref 0–0.03)
WBC # BLD AUTO: 5.95 10*3/MM3 (ref 3.4–10.8)
WHOLE BLOOD HOLD SPECIMEN: NORMAL
WHOLE BLOOD HOLD SPECIMEN: NORMAL

## 2020-11-18 PROCEDURE — C9803 HOPD COVID-19 SPEC COLLECT: HCPCS | Performed by: EMERGENCY MEDICINE

## 2020-11-18 PROCEDURE — U0004 COV-19 TEST NON-CDC HGH THRU: HCPCS | Performed by: EMERGENCY MEDICINE

## 2020-11-18 PROCEDURE — 93010 ELECTROCARDIOGRAM REPORT: CPT | Performed by: INTERNAL MEDICINE

## 2020-11-18 PROCEDURE — 80053 COMPREHEN METABOLIC PANEL: CPT | Performed by: EMERGENCY MEDICINE

## 2020-11-18 PROCEDURE — 84484 ASSAY OF TROPONIN QUANT: CPT | Performed by: EMERGENCY MEDICINE

## 2020-11-18 PROCEDURE — 70450 CT HEAD/BRAIN W/O DYE: CPT

## 2020-11-18 PROCEDURE — 83735 ASSAY OF MAGNESIUM: CPT | Performed by: EMERGENCY MEDICINE

## 2020-11-18 PROCEDURE — 85025 COMPLETE CBC W/AUTO DIFF WBC: CPT | Performed by: EMERGENCY MEDICINE

## 2020-11-18 PROCEDURE — 99283 EMERGENCY DEPT VISIT LOW MDM: CPT

## 2020-11-18 PROCEDURE — 93005 ELECTROCARDIOGRAM TRACING: CPT | Performed by: EMERGENCY MEDICINE

## 2020-11-18 RX ORDER — SODIUM CHLORIDE 0.9 % (FLUSH) 0.9 %
10 SYRINGE (ML) INJECTION AS NEEDED
Status: DISCONTINUED | OUTPATIENT
Start: 2020-11-18 | End: 2020-11-18 | Stop reason: HOSPADM

## 2020-11-18 NOTE — ED NOTES
Patient to er from home with c/o she  Passed out this am around 9-10am. Patient reported she got up off the floor around 1100 and went to bed. Stated she woke up this afternoon around 1630. Patient stated she did hit her right side of head. Patient stated not blood thinners or neck pain or any other injury. Patient reported her PCP wanted her to come get checked out. Patient has mask on in triage along with staff.      Yomi Genao RN  11/18/20 [id number]

## 2020-11-19 LAB
QT INTERVAL: 372 MS
SARS-COV-2 RNA RESP QL NAA+PROBE: NOT DETECTED

## 2020-11-19 NOTE — ED PROVIDER NOTES
EMERGENCY DEPARTMENT ENCOUNTER    Room Number:  23/23  Date of encounter:  11/18/2020  PCP: System, Provider Not In  Historian: Patient     I used full protective equipment while examining this patient.  This includes face mask, gloves and protective eyewear.  I washed my hands before entering the room and immediately upon leaving the room.  Patient was wearing a surgical mask.      HPI:  Chief Complaint: Syncope  A complete HPI/ROS/PMH/PSH/SH/FH are unobtainable due to: None    Context: Loren Buenrostro is a 80 y.o. female who presents to the ED c/o syncopal episode that occurred between 9 and 930 this morning.  Patient states she just finished eating breakfast when she began to feel drowsy.  She remembers laying her head down on the table.  The next thing she remembers is waking up on the floor.  She denies any preceding headache, chest pain, abdominal pain, palpitations, dizziness, or vision changes.  Patient hit the right side of her face on the ground.  She denies any other injury.  Patient also denies any recent illness, cough, fever, shortness of breath, numbness/tingling, focal weakness, nausea, vomiting, or dysuria.  Patient states she did have a syncopal episode several years ago.  She is not on any anticoagulants.      PAST MEDICAL HISTORY  Active Ambulatory Problems     Diagnosis Date Noted   • Hypertensive crisis 09/22/2017   • Parotiditis 09/23/2017   • IZA (acute kidney injury) (CMS/Formerly [facility]) 09/23/2017   • Localized edema 09/23/2017     Resolved Ambulatory Problems     Diagnosis Date Noted   • No Resolved Ambulatory Problems     Past Medical History:   Diagnosis Date   • Abdominal pain, epigastric    • Abnormal weight loss    • Anemia    • Anemia of chronic disease    • Benign colon polyp    • Bloating    • Blunt head trauma    • Cardiomegaly    • Change in bowel movement    • Chest pain    • Coarse tremors    • Constipation    • Contact dermatitis    • Coronary artery disease    • Dermatitis    • Dyspnea     • Ear noise/buzzing    • Edema    • Encounter for screening mammogram for breast cancer    • Epistaxis    • Esophageal stricture    • Fatigue    • GERD (gastroesophageal reflux disease)    • GI bleed    • Glaucoma    • Gout    • Heart disease    • Hernia    • Herpes    • Herpes simplex    • History of colon polyps    • Hospital discharge follow-up    • Hypertension    • Ingrown toenail    • Initial Medicare annual wellness visit    • Insomnia    • Iron deficiency anemia    • Leucopenia    • Medicare annual wellness visit, subsequent    • Muscle spasm    • Myocardial infarction (CMS/HCC)    • OA (osteoarthritis)    • Post-menopausal    • Rash    • Renal artery stenosis (CMS/HCC)    • Renal disorder    • Scalp pruritus    • Sciatica of left side    • Seasonal allergies    • Seborrheic dermatitis    • Sinusitis    • Sleep apnea, obstructive    • Stage 3 chronic kidney disease    • 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)    • Syncope    • UTI (urinary tract infection)    • Vitamin B12 deficiency    • Vitamin D deficiency          PAST SURGICAL HISTORY  Past Surgical History:   Procedure Laterality Date   • COLONOSCOPY  11/07/2012    nonthrombosed external hemorrhoids,diverticulosis in the ascending colon, no-bleeding internal hemmorrhoids   • COLONOSCOPY  11/13/2014    internal/external hemorrhoids,diverticulosis sigmoid, descending, transverse, ascending, 2mm polyp transverse (BX)   • CORONARY ANGIOPLASTY  2002    cath laser angioplasty of lesion 2   • CYST REMOVAL     • TRANSLUMINAL ANGIOPLASTY     • UPPER GASTROINTESTINAL ENDOSCOPY  11/13/2014    benign appearing stricture dilated, erythematous mucosa antrum (BX)         FAMILY HISTORY  Family History   Problem Relation Age of Onset   • Coronary artery disease Other    • Hypertension Other    • Diabetes Other    • Migraines Other    • Stroke Other    • Colon cancer Neg Hx    • Colon polyps Neg Hx          SOCIAL HISTORY  Social  History     Socioeconomic History   • Marital status:      Spouse name: Not on file   • Number of children: Not on file   • Years of education: Not on file   • Highest education level: Not on file   Tobacco Use   • Smoking status: Former Smoker   Substance and Sexual Activity   • Alcohol use: No   • Drug use: No   • Sexual activity: Defer         ALLERGIES  Sulfa antibiotics       REVIEW OF SYSTEMS  Review of Systems      All systems have been reviewed and are negative except as as discussed in the HPI    PHYSICAL EXAM    I have reviewed the triage vital signs and nursing notes.    ED Triage Vitals   Temp Heart Rate Resp BP SpO2   11/18/20 1757 11/18/20 1757 11/18/20 1900 11/18/20 1757 11/18/20 1757   97.6 °F (36.4 °C) 108 14 (!) 184/93 98 %      Temp src Heart Rate Source Patient Position BP Location FiO2 (%)   11/18/20 1757 -- 11/18/20 1757 11/18/20 1900 --   Tympanic  Sitting Right arm        Physical Exam  GENERAL: Awake, alert, oriented x3  HENT: Mild tenderness and minimal swelling over the right lateral periorbital area, no laceration, midface is stable, no malocclusion,, nares patent, moist mucous membranes  NECK: supple, no cervical spine tenderness, no carotid bruit  EYES: no scleral icterus, extraocular muscles intact, right conjunctiva is mildly injected (patient states this is chronic)  CV: regular rhythm, regular rate, no murmur  RESPIRATORY: normal effort, clear to auscultation bilaterally, chest is nontender  ABDOMEN: soft, nontender, nondistended  MUSCULOSKELETAL: Extremities are nontender and without obvious deformity.  There is normal range of motion in all extremities.  There is no calf tenderness or pedal edema.  Back is nontender.  NEURO:  No facial droop.  Speech is clear and fluent.  No aphasia.  Normal strength and light touch sensation all extremities.  GCS 15.  SKIN: warm, dry, no rash  PSYCH: Normal mood and affect      LAB RESULTS  Recent Results (from the past 24 hour(s))    Comprehensive Metabolic Panel    Collection Time: 11/18/20  6:59 PM    Specimen: Blood   Result Value Ref Range    Glucose 99 65 - 99 mg/dL    BUN 20 8 - 23 mg/dL    Creatinine 1.67 (H) 0.57 - 1.00 mg/dL    Sodium 140 136 - 145 mmol/L    Potassium 4.1 3.5 - 5.2 mmol/L    Chloride 104 98 - 107 mmol/L    CO2 28.2 22.0 - 29.0 mmol/L    Calcium 10.1 8.6 - 10.5 mg/dL    Total Protein 7.4 6.0 - 8.5 g/dL    Albumin 4.10 3.50 - 5.20 g/dL    ALT (SGPT) 17 1 - 33 U/L    AST (SGOT) 24 1 - 32 U/L    Alkaline Phosphatase 88 39 - 117 U/L    Total Bilirubin 0.2 0.0 - 1.2 mg/dL    eGFR  African Amer 36 (L) >60 mL/min/1.73    Globulin 3.3 gm/dL    A/G Ratio 1.2 g/dL    BUN/Creatinine Ratio 12.0 7.0 - 25.0    Anion Gap 7.8 5.0 - 15.0 mmol/L   Magnesium    Collection Time: 11/18/20  6:59 PM    Specimen: Blood   Result Value Ref Range    Magnesium 2.0 1.6 - 2.4 mg/dL   Troponin    Collection Time: 11/18/20  6:59 PM    Specimen: Blood   Result Value Ref Range    Troponin T <0.010 0.000 - 0.030 ng/mL   Light Blue Top    Collection Time: 11/18/20  6:59 PM   Result Value Ref Range    Extra Tube hold for add-on    Green Top (Gel)    Collection Time: 11/18/20  6:59 PM   Result Value Ref Range    Extra Tube Hold for add-ons.    Lavender Top    Collection Time: 11/18/20  6:59 PM   Result Value Ref Range    Extra Tube hold for add-on    Gold Top - SST    Collection Time: 11/18/20  6:59 PM   Result Value Ref Range    Extra Tube Hold for add-ons.    CBC Auto Differential    Collection Time: 11/18/20  6:59 PM    Specimen: Blood   Result Value Ref Range    WBC 5.95 3.40 - 10.80 10*3/mm3    RBC 5.52 (H) 3.77 - 5.28 10*6/mm3    Hemoglobin 15.0 12.0 - 15.9 g/dL    Hematocrit 45.7 34.0 - 46.6 %    MCV 82.8 79.0 - 97.0 fL    MCH 27.2 26.6 - 33.0 pg    MCHC 32.8 31.5 - 35.7 g/dL    RDW 13.7 12.3 - 15.4 %    RDW-SD 40.8 37.0 - 54.0 fl    MPV 10.2 6.0 - 12.0 fL    Platelets 240 140 - 450 10*3/mm3    Neutrophil % 69.8 42.7 - 76.0 %    Lymphocyte % 19.8  19.6 - 45.3 %    Monocyte % 8.9 5.0 - 12.0 %    Eosinophil % 0.7 0.3 - 6.2 %    Basophil % 0.5 0.0 - 1.5 %    Immature Grans % 0.3 0.0 - 0.5 %    Neutrophils, Absolute 4.15 1.70 - 7.00 10*3/mm3    Lymphocytes, Absolute 1.18 0.70 - 3.10 10*3/mm3    Monocytes, Absolute 0.53 0.10 - 0.90 10*3/mm3    Eosinophils, Absolute 0.04 0.00 - 0.40 10*3/mm3    Basophils, Absolute 0.03 0.00 - 0.20 10*3/mm3    Immature Grans, Absolute 0.02 0.00 - 0.05 10*3/mm3    nRBC 0.0 0.0 - 0.2 /100 WBC   ECG 12 Lead    Collection Time: 11/18/20  7:08 PM   Result Value Ref Range    QT Interval 372 ms       Ordered the above labs and independently reviewed the results.      RADIOLOGY  Ct Head Without Contrast    Result Date: 11/18/2020  CT HEAD WO CONTRAST-  INDICATIONS: Syncope  TECHNIQUE: Radiation dose reduction techniques were utilized, including automated exposure control and exposure modulation based on body size. Noncontrast head CT  COMPARISON: 04/09/2019  FINDINGS:    No acute intracranial hemorrhage, midline shift or mass effect. No acute territorial infarct is identified.  Mild periventricular hypodensities suggest chronic small vessel ischemic change in a patient this age.  Arterial calcifications are seen at the base of the brain.  Ventricles, cisterns, cerebral sulci are unremarkable for patient age.  The visualized paranasal sinuses, orbits, mastoid air cells are unremarkable.           No acute intracranial hemorrhage or hydrocephalus. If there is further clinical concern, MRI could be considered for further evaluation.  This report was finalized on 11/18/2020 7:42 PM by Dr. Juarez Franco M.D.        I ordered the above noted radiological studies. Reviewed by me and discussed with radiologist.  See dictation for official radiology interpretation.      PROCEDURES  Procedures      MEDICATIONS GIVEN IN ER    Medications - No data to display      PROGRESS, DATA ANALYSIS, CONSULTS, AND MEDICAL DECISION MAKING    All labs have been  independently reviewed by me.  All radiology studies have been reviewed by me and discussed with radiologist dictating the report.   EKG's independently viewed and interpreted by me.  I have reviewed the nurse's notes, vital signs, past medical history, and medication list.  Discussion below represents my analysis of pertinent findings related to patient's condition, differential diagnosis, treatment plan and final disposition.      ED Course as of Nov 18 2326 Wed Nov 18, 2020   1901 Old records reviewed.  Echocardiogram done in April 2019 showed normal LV systolic function with grade 1 LV diastolic dysfunction.  Patient was last admitted here in September 2017 for a hypertensive crisis.    [WH]   1913 EKG          EKG time: 1908  Rhythm/Rate: Sinus rhythm, rate 81  P waves and ME: Normal, first-degree AV block  QRS, axis: Normal  ST and T waves: Nonspecific T wave changes inferiorly    Interpreted Contemporaneously by me, independently viewed  EKG is changed compared to prior EKG done 4/9/2019.  ME interval was normal at that time.  No new ischemic changes.      [WH]   1947 Stable   Creatinine(!): 1.67 []   2018 Test results discussed with the patient.  She is resting comfortably.  Vital signs are unremarkable.  Patient states she would like to go home.  I advised her to follow-up with her PCP soon as possible.  Return precautions were discussed.  Patient is requesting a Covid test.  This will be done prior to discharge.    [WH]   2032 Patient's work-up was unremarkable.  Creatinine was mildly elevated but stable.  Patient had a normal physical exam.  Vital signs were stable.  Patient will be referred to cardiology for follow-up.    [WH]      ED Course User Index  [] Narendra Maria MD       AS OF 23:26 EST VITALS:    BP - 146/77  HR - 77  TEMP - 97.6 °F (36.4 °C) (Tympanic)  O2 SATS - 99%      DIAGNOSIS  Final diagnoses:   Syncope, unspecified syncope type   Chronic renal impairment, unspecified CKD  stage         DISPOSITION  Discharge    DISCHARGE    Patient discharged in stable condition.    Reviewed implications of results, diagnosis, meds, responsibility to follow up, warning signs and symptoms of possible worsening, potential complications and reasons to return to ER, including chest pain, dizziness, fainting, shortness of breath, palpitations, or other concern..    Patient/Family voiced understanding of above instructions.    Discussed plan for discharge, as there is no emergent indication for admission. Patient referred to primary care provider for BP management due to today's BP. Pt/family is agreeable and understands need for follow up and repeat testing.  Pt is aware that discharge does not mean that nothing is wrong but it indicates no emergency is present that requires admission and they must continue care with follow-up as given below or physician of their choice.     FOLLOW-UP  Your doctor          [facility]ARD[facility]  [address] [place] [id number] [phone]  Schedule an appointment as soon as possible for a visit            Medication List      No changes were made to your prescriptions during this visit.           Dictated utilizing Dragon dictation:  Much of this encounter note is an electronic transcription/translation of spoken language to printed text. The electronic translation of spoken language may permit erroneous, or at times, nonsensical words or phrases to be inadvertently transcribed; Although I have reviewed the note for such errors, some may still exist.     Narendra Maria MD  11/18/20 [id number]

## 2020-11-19 NOTE — ED NOTES
Pt ambulatory c steady gait, AAOx4, ABC's intact, NAD noted at this time. Pt discharged c belongings and educational packet. PPE worn by this RN c pt and family wearing mask during encounters.      Tate Daugherty, RN  11/18/20 2057

## 2020-11-19 NOTE — DISCHARGE INSTRUCTIONS
Follow-up with your primary care doctor soon as possible.  Return to the emergency department for chest pain, dizziness, palpitations, recurrent fainting, shortness of breath, headache, or other concern.

## 2021-01-21 ENCOUNTER — HOSPITAL ENCOUNTER (OUTPATIENT)
Facility: HOSPITAL | Age: 81
Discharge: HOME | End: 2021-01-22
Attending: EMERGENCY MEDICINE | Admitting: INTERNAL MEDICINE

## 2021-01-21 ENCOUNTER — APPOINTMENT (OUTPATIENT)
Dept: GENERAL RADIOLOGY | Facility: HOSPITAL | Age: 81
End: 2021-01-21

## 2021-01-21 DIAGNOSIS — R13.10 DIFFICULTY SWALLOWING LIQUIDS: Primary | ICD-10-CM

## 2021-01-21 DIAGNOSIS — R13.19 ESOPHAGEAL DYSPHAGIA: ICD-10-CM

## 2021-01-21 LAB
ALBUMIN SERPL-MCNC: 3.7 G/DL (ref 3.5–5.2)
ALBUMIN/GLOB SERPL: 1.4 G/DL
ALP SERPL-CCNC: 72 U/L (ref 39–117)
ALT SERPL W P-5'-P-CCNC: 11 U/L (ref 1–33)
ANION GAP SERPL CALCULATED.3IONS-SCNC: 8.8 MMOL/L (ref 5–15)
AST SERPL-CCNC: 14 U/L (ref 1–32)
BASOPHILS # BLD AUTO: 0.02 10*3/MM3 (ref 0–0.2)
BASOPHILS NFR BLD AUTO: 0.5 % (ref 0–1.5)
BILIRUB SERPL-MCNC: 0.4 MG/DL (ref 0–1.2)
BUN SERPL-MCNC: 13 MG/DL (ref 8–23)
BUN/CREAT SERPL: 9.9 (ref 7–25)
CALCIUM SPEC-SCNC: 9.6 MG/DL (ref 8.6–10.5)
CHLORIDE SERPL-SCNC: 108 MMOL/L (ref 98–107)
CO2 SERPL-SCNC: 27.2 MMOL/L (ref 22–29)
CREAT SERPL-MCNC: 1.31 MG/DL (ref 0.57–1)
DEPRECATED RDW RBC AUTO: 42.3 FL (ref 37–54)
EOSINOPHIL # BLD AUTO: 0.09 10*3/MM3 (ref 0–0.4)
EOSINOPHIL NFR BLD AUTO: 2.2 % (ref 0.3–6.2)
ERYTHROCYTE [DISTWIDTH] IN BLOOD BY AUTOMATED COUNT: 14.2 % (ref 12.3–15.4)
GFR SERPL CREATININE-BSD FRML MDRD: 47 ML/MIN/1.73
GLOBULIN UR ELPH-MCNC: 2.7 GM/DL
GLUCOSE SERPL-MCNC: 92 MG/DL (ref 65–99)
HCT VFR BLD AUTO: 39.3 % (ref 34–46.6)
HGB BLD-MCNC: 13.1 G/DL (ref 12–15.9)
HOLD SPECIMEN: NORMAL
HOLD SPECIMEN: NORMAL
IMM GRANULOCYTES # BLD AUTO: 0.01 10*3/MM3 (ref 0–0.05)
IMM GRANULOCYTES NFR BLD AUTO: 0.2 % (ref 0–0.5)
LYMPHOCYTES # BLD AUTO: 1.2 10*3/MM3 (ref 0.7–3.1)
LYMPHOCYTES NFR BLD AUTO: 28.7 % (ref 19.6–45.3)
MCH RBC QN AUTO: 27.6 PG (ref 26.6–33)
MCHC RBC AUTO-ENTMCNC: 33.3 G/DL (ref 31.5–35.7)
MCV RBC AUTO: 82.9 FL (ref 79–97)
MONOCYTES # BLD AUTO: 0.41 10*3/MM3 (ref 0.1–0.9)
MONOCYTES NFR BLD AUTO: 9.8 % (ref 5–12)
NEUTROPHILS NFR BLD AUTO: 2.45 10*3/MM3 (ref 1.7–7)
NEUTROPHILS NFR BLD AUTO: 58.6 % (ref 42.7–76)
NRBC BLD AUTO-RTO: 0 /100 WBC (ref 0–0.2)
PLATELET # BLD AUTO: 196 10*3/MM3 (ref 140–450)
PMV BLD AUTO: 9.8 FL (ref 6–12)
POTASSIUM SERPL-SCNC: 3.9 MMOL/L (ref 3.5–5.2)
PROT SERPL-MCNC: 6.4 G/DL (ref 6–8.5)
QT INTERVAL: 438 MS
RBC # BLD AUTO: 4.74 10*6/MM3 (ref 3.77–5.28)
SARS-COV-2 RNA RESP QL NAA+PROBE: NOT DETECTED
SODIUM SERPL-SCNC: 144 MMOL/L (ref 136–145)
TROPONIN T SERPL-MCNC: <0.01 NG/ML (ref 0–0.03)
WBC # BLD AUTO: 4.18 10*3/MM3 (ref 3.4–10.8)
WHOLE BLOOD HOLD SPECIMEN: NORMAL
WHOLE BLOOD HOLD SPECIMEN: NORMAL

## 2021-01-21 PROCEDURE — U0003 INFECTIOUS AGENT DETECTION BY NUCLEIC ACID (DNA OR RNA); SEVERE ACUTE RESPIRATORY SYNDROME CORONAVIRUS 2 (SARS-COV-2) (CORONAVIRUS DISEASE [COVID-19]), AMPLIFIED PROBE TECHNIQUE, MAKING USE OF HIGH THROUGHPUT TECHNOLOGIES AS DESCRIBED BY CMS-2020-01-R: HCPCS | Performed by: NURSE PRACTITIONER

## 2021-01-21 PROCEDURE — G0378 HOSPITAL OBSERVATION PER HR: HCPCS

## 2021-01-21 PROCEDURE — 99284 EMERGENCY DEPT VISIT MOD MDM: CPT

## 2021-01-21 PROCEDURE — 71046 X-RAY EXAM CHEST 2 VIEWS: CPT

## 2021-01-21 PROCEDURE — 93005 ELECTROCARDIOGRAM TRACING: CPT

## 2021-01-21 PROCEDURE — C9803 HOPD COVID-19 SPEC COLLECT: HCPCS

## 2021-01-21 PROCEDURE — 93010 ELECTROCARDIOGRAM REPORT: CPT | Performed by: INTERNAL MEDICINE

## 2021-01-21 PROCEDURE — 80053 COMPREHEN METABOLIC PANEL: CPT

## 2021-01-21 PROCEDURE — 84484 ASSAY OF TROPONIN QUANT: CPT

## 2021-01-21 PROCEDURE — 96374 THER/PROPH/DIAG INJ IV PUSH: CPT

## 2021-01-21 PROCEDURE — 85025 COMPLETE CBC W/AUTO DIFF WBC: CPT

## 2021-01-21 RX ORDER — FAMOTIDINE 10 MG/ML
20 INJECTION, SOLUTION INTRAVENOUS ONCE
Status: COMPLETED | OUTPATIENT
Start: 2021-01-21 | End: 2021-01-21

## 2021-01-21 RX ORDER — CETIRIZINE HYDROCHLORIDE 10 MG/1
10 TABLET ORAL DAILY
COMMUNITY

## 2021-01-21 RX ORDER — FAMOTIDINE 20 MG/1
20 TABLET, FILM COATED ORAL 2 TIMES DAILY
COMMUNITY
End: 2021-01-22 | Stop reason: HOSPADM

## 2021-01-21 RX ORDER — SODIUM CHLORIDE 0.9 % (FLUSH) 0.9 %
10 SYRINGE (ML) INJECTION AS NEEDED
Status: DISCONTINUED | OUTPATIENT
Start: 2021-01-21 | End: 2021-01-22 | Stop reason: HOSPADM

## 2021-01-21 RX ADMIN — FAMOTIDINE 20 MG: 10 INJECTION INTRAVENOUS at 19:27

## 2021-01-21 NOTE — ED TRIAGE NOTES
Pt to ER via PV. Pt states that she is unable to keep anything down that started this morning. Pt c/o indigestion as well. Pt states hx of MI in 2002 and had similar symptoms.     Patient in mask. This RN in appropriate PPE - including mask, goggles, and gloves during all of patient care.

## 2021-01-21 NOTE — ED PROVIDER NOTES
EMERGENCY DEPARTMENT ENCOUNTER    Room Number:  05/05  Date seen:  1/21/2021  Time seen: 18:56 EST  PCP: System, Provider Not In  Historian: patient  HPI:  Chief complaint:difficulty swallowing  A complete HPI/ROS/PMH/PSH/SH/FH are unobtainable due to: n/a  Context:Loren Buenrostro is a 80 y.o. female who presents to the ED with c/o inability to keep liquids down since this morning.  It is not made better or worse by anything.  She states that she ate some chicken this morning and then took some pills but that after that she was unable to keep even water down.  She has tried water on 3 different occasions today and every time she has belching and automatic vomiting.  She has no history of this problem before.  She denies any chest pain, shortness of breath, diaphoresis or nausea.  She has some complaints of indigestion.    Patient was placed in face mask in first look. Patient was wearing facemask when I entered the room and throughout our encounter. I wore full protective equipment throughout this patient encounter including a face mask, eye shield and gloves. Hand hygiene/washing of hands was performed before donning protective equipment and after removal when leaving the room.    MEDICAL RECORD REVIEW    ALLERGIES  Sulfa antibiotics    PAST MEDICAL HISTORY  Active Ambulatory Problems     Diagnosis Date Noted   • Hypertensive crisis 09/22/2017   • Parotiditis 09/23/2017   • IZA (acute kidney injury) (CMS/[facility]) 09/23/2017   • Localized edema 09/23/2017     Resolved Ambulatory Problems     Diagnosis Date Noted   • No Resolved Ambulatory Problems     Past Medical History:   Diagnosis Date   • Abdominal pain, epigastric    • Abnormal weight loss    • Anemia    • Anemia of chronic disease    • Benign colon polyp    • Bloating    • Blunt head trauma    • Cardiomegaly    • Change in bowel movement    • Chest pain    • Coarse tremors    • Constipation    • Contact dermatitis    • Coronary artery disease    • Dermatitis    •  Dyspnea    • Ear noise/buzzing    • Edema    • Encounter for screening mammogram for breast cancer    • Epistaxis    • Esophageal stricture    • Fatigue    • GERD (gastroesophageal reflux disease)    • GI bleed    • Glaucoma    • Gout    • Heart disease    • Hernia    • Herpes    • Herpes simplex    • History of colon polyps    • Hospital discharge follow-up    • Hypertension    • Ingrown toenail    • Initial Medicare annual wellness visit    • Insomnia    • Iron deficiency anemia    • Leucopenia    • Medicare annual wellness visit, subsequent    • Muscle spasm    • Myocardial infarction (CMS/HCC)    • OA (osteoarthritis)    • Post-menopausal    • Rash    • Renal artery stenosis (CMS/HCC)    • Renal disorder    • Scalp pruritus    • Sciatica of left side    • Seasonal allergies    • Seborrheic dermatitis    • Sinusitis    • Sleep apnea, obstructive    • Stage 3 chronic kidney disease (CMS/HCC)    • 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)    • Syncope    • UTI (urinary tract infection)    • Vitamin B12 deficiency    • Vitamin D deficiency        PAST SURGICAL HISTORY  Past Surgical History:   Procedure Laterality Date   • COLONOSCOPY  11/07/2012    nonthrombosed external hemorrhoids,diverticulosis in the ascending colon, no-bleeding internal hemmorrhoids   • COLONOSCOPY  11/13/2014    internal/external hemorrhoids,diverticulosis sigmoid, descending, transverse, ascending, 2mm polyp transverse (BX)   • CORONARY ANGIOPLASTY  2002    cath laser angioplasty of lesion 2   • CYST REMOVAL     • TRANSLUMINAL ANGIOPLASTY     • UPPER GASTROINTESTINAL ENDOSCOPY  11/13/2014    benign appearing stricture dilated, erythematous mucosa antrum (BX)       FAMILY HISTORY  Family History   Problem Relation Age of Onset   • Coronary artery disease Other    • Hypertension Other    • Diabetes Other    • Migraines Other    • Stroke Other    • Colon cancer Neg Hx    • Colon polyps Neg Hx        SOCIAL  HISTORY  Social History     Socioeconomic History   • Marital status:      Spouse name: Not on file   • Number of children: Not on file   • Years of education: Not on file   • Highest education level: Not on file   Tobacco Use   • Smoking status: Former Smoker   Substance and Sexual Activity   • Alcohol use: No   • Drug use: No   • Sexual activity: Defer       REVIEW OF SYSTEMS  Review of Systems    All systems reviewed and negative except for those discussed in HPI.     PHYSICAL EXAM    ED Triage Vitals   Temp Heart Rate Resp BP SpO2   01/21/21 1613 01/21/21 1613 01/21/21 1613 01/21/21 1810 01/21/21 1613   97.5 °F (36.4 °C) 65 18 142/68 99 %      Temp src Heart Rate Source Patient Position BP Location FiO2 (%)   -- -- -- -- --            Physical Exam    I have reviewed the triage vital signs and nursing notes.      GENERAL: not distressed  HENT: nares patent, mm moist, no posterior oropharynx erythema, exudates or tonsillar abscess EYES: no scleral icterus  NECK: no ROM limitations  CV: regular rhythm, regular rate, no murmur, no rubs and no gallops   RESPIRATORY: normal effort, clear to auscultate bilaterally ABDOMEN: soft, rounded, no focal tenderness, bowel sounds normal  : deferred  MUSCULOSKELETAL: no deformity  NEURO: alert, moves all extremities, follows commands  SKIN: warm, dry    I attempted to have patient drink some water taking small sips.  Several sips stayed down without any problems.  She did have some obvious belching with each sip of water.  I then escalated to applesauce at which point she had immediate regurgitation.    LAB RESULTS  Recent Results (from the past 24 hour(s))   ECG 12 Lead    Collection Time: 01/21/21  4:20 PM   Result Value Ref Range    QT Interval 438 ms   Comprehensive Metabolic Panel    Collection Time: 01/21/21  6:16 PM    Specimen: Blood   Result Value Ref Range    Glucose 92 65 - 99 mg/dL    BUN 13 8 - 23 mg/dL    Creatinine 1.31 (H) 0.57 - 1.00 mg/dL    Sodium  144 136 - 145 mmol/L    Potassium 3.9 3.5 - 5.2 mmol/L    Chloride 108 (H) 98 - 107 mmol/L    CO2 27.2 22.0 - 29.0 mmol/L    Calcium 9.6 8.6 - 10.5 mg/dL    Total Protein 6.4 6.0 - 8.5 g/dL    Albumin 3.70 3.50 - 5.20 g/dL    ALT (SGPT) 11 1 - 33 U/L    AST (SGOT) 14 1 - 32 U/L    Alkaline Phosphatase 72 39 - 117 U/L    Total Bilirubin 0.4 0.0 - 1.2 mg/dL    eGFR  African Amer 47 (L) >60 mL/min/1.73    Globulin 2.7 gm/dL    A/G Ratio 1.4 g/dL    BUN/Creatinine Ratio 9.9 7.0 - 25.0    Anion Gap 8.8 5.0 - 15.0 mmol/L   Troponin    Collection Time: 01/21/21  6:16 PM    Specimen: Blood   Result Value Ref Range    Troponin T <0.010 0.000 - 0.030 ng/mL   Light Blue Top    Collection Time: 01/21/21  6:16 PM   Result Value Ref Range    Extra Tube hold for add-on    Green Top (Gel)    Collection Time: 01/21/21  6:16 PM   Result Value Ref Range    Extra Tube Hold for add-ons.    Lavender Top    Collection Time: 01/21/21  6:16 PM   Result Value Ref Range    Extra Tube hold for add-on    Gold Top - SST    Collection Time: 01/21/21  6:16 PM   Result Value Ref Range    Extra Tube Hold for add-ons.    CBC Auto Differential    Collection Time: 01/21/21  6:16 PM    Specimen: Blood   Result Value Ref Range    WBC 4.18 3.40 - 10.80 10*3/mm3    RBC 4.74 3.77 - 5.28 10*6/mm3    Hemoglobin 13.1 12.0 - 15.9 g/dL    Hematocrit 39.3 34.0 - 46.6 %    MCV 82.9 79.0 - 97.0 fL    MCH 27.6 26.6 - 33.0 pg    MCHC 33.3 31.5 - 35.7 g/dL    RDW 14.2 12.3 - 15.4 %    RDW-SD 42.3 37.0 - 54.0 fl    MPV 9.8 6.0 - 12.0 fL    Platelets 196 140 - 450 10*3/mm3    Neutrophil % 58.6 42.7 - 76.0 %    Lymphocyte % 28.7 19.6 - 45.3 %    Monocyte % 9.8 5.0 - 12.0 %    Eosinophil % 2.2 0.3 - 6.2 %    Basophil % 0.5 0.0 - 1.5 %    Immature Grans % 0.2 0.0 - 0.5 %    Neutrophils, Absolute 2.45 1.70 - 7.00 10*3/mm3    Lymphocytes, Absolute 1.20 0.70 - 3.10 10*3/mm3    Monocytes, Absolute 0.41 0.10 - 0.90 10*3/mm3    Eosinophils, Absolute 0.09 0.00 - 0.40 10*3/mm3     Basophils, Absolute 0.02 0.00 - 0.20 10*3/mm3    Immature Grans, Absolute 0.01 0.00 - 0.05 10*3/mm3    nRBC 0.0 0.0 - 0.2 /100 WBC   COVID-19,[facility] IN-HOUSE CEPHEID, NP SWAB IN TRANSPORT MEDIA 8-12 HR TAT - Swab, Nasopharynx    Collection Time: 01/21/21  8:12 PM    Specimen: Nasopharynx; Swab   Result Value Ref Range    COVID19 Not Detected Not Detected - Ref. Range         RADIOLOGY RESULTS  XR Chest 2 View   Final Result   No acute process.       This report was finalized on 1/21/2021 6:47 PM by Dr. Carlos Fowler M.D.                PROGRESS, DATA ANALYSIS, CONSULTS AND MEDICAL DECISION MAKING  All labs have been independently reviewed by me.  All radiology studies have been reviewed by me and discussed with radiologist dictating the report.  EKG's independently viewed and interpreted by me unless stated otherwise. Discussion below represents my analysis of pertinent findings related to patient's condition, differential diagnosis, treatment plan and final disposition.     ED Course as of Jan 21 2135   Thu Jan 21, 2021   1855 I viewed chest x-ray on PACS.  My interpretation is no focal infiltrates    [EW]   1919 EKG   viewed by ER MD prior to my interpretation      EKG time: 1620  Rhythm/Rate: 55, sinus rhythm  P waves and KY: Normal KY, normal KY interval  QRS, axis: Normal QRS, normal axis  ST and T waves: No acute ST-T wave abnormality    Interpreted Contemporaneously by me, independently viewed  Similar to prior but improved from prior dated 11/18/2020 with regard to prolonged NOLAN.      [EW]   2017 Discussed patient with Dr. Johnson on-call for GI.  She would like to admit the patient overnight to medicine and she will put on the schedule in the a.m. for scope.    [EW]   2114 Discussed patient with NAYANA Hoyos on call for [facility].  Agrees to admit to Dr. Shin  She is aware Dr. Johnson plans to take patient to Endo tomorrow for scope.     [EW]      ED Course User Index  [EW] Alexandra Molina APRN     DDX:  "esophageal food impaction, food bolus, dyspepsia    MDM:  Pt is nontoxic-appearing.  She does not need to go to the operating room tonight for removal of a food bolus.  She is actually very well-appearing.  I did discuss case with Dr. Goss on-call for GI and she will take patient to the scope in the morning for evaluation.  Preprocedure Covid testing ordered.  The patient is not hypoxic or tachycardic.     Reviewed pt's history and workup with Dr. Peacock.  After a bedside evaluation, Dr. Peacock agrees with the plan of care.    Based on the patient's lab findings and presenting symptoms, the doctor and I feel it is appropriate to admit the patient for further management, evaluation, and treatment.  I have discussed this with the admitting team.  I have also discussed this with the patient/family.  They are in agreement with admission.          Disposition vitals:  /79   Pulse 55   Temp 97.5 °F (36.4 °C)   Resp 18   Ht 152.4 cm (60\")   Wt 68.9 kg (152 lb)   SpO2 98%   BMI 29.69 kg/m²       DIAGNOSIS  Final diagnoses:   Difficulty swallowing liquids       Admission       Jesse Alexandra Josseline, APRN  01/21/21 2135    "

## 2021-01-21 NOTE — ED TRIAGE NOTES
Pt to ED with c/o drinking water at 0900 today and states it immediately came back up, indigestion, denies active CP , SOA at this time, reports feeling fatigued.     Pt placed in mask at triage, all staff wearing appropriate ppe

## 2021-01-22 ENCOUNTER — ANESTHESIA (OUTPATIENT)
Dept: PERIOP | Facility: HOSPITAL | Age: 81
End: 2021-01-22

## 2021-01-22 ENCOUNTER — ANESTHESIA EVENT (OUTPATIENT)
Dept: PERIOP | Facility: HOSPITAL | Age: 81
End: 2021-01-22

## 2021-01-22 VITALS
DIASTOLIC BLOOD PRESSURE: 80 MMHG | OXYGEN SATURATION: 99 % | TEMPERATURE: 97.5 F | HEART RATE: 85 BPM | RESPIRATION RATE: 16 BRPM | BODY MASS INDEX: 30.23 KG/M2 | SYSTOLIC BLOOD PRESSURE: 149 MMHG | HEIGHT: 60 IN | WEIGHT: 154 LBS

## 2021-01-22 PROBLEM — N18.30 CKD (CHRONIC KIDNEY DISEASE) STAGE 3, GFR 30-59 ML/MIN (HCC): Status: ACTIVE | Noted: 2021-01-22

## 2021-01-22 PROBLEM — K22.2 ESOPHAGEAL STRICTURE: Status: ACTIVE | Noted: 2021-01-22

## 2021-01-22 PROBLEM — R13.10 DYSPHAGIA: Status: ACTIVE | Noted: 2021-01-22

## 2021-01-22 PROBLEM — R13.19 ESOPHAGEAL DYSPHAGIA: Status: ACTIVE | Noted: 2021-01-21

## 2021-01-22 PROBLEM — G47.33 OSA (OBSTRUCTIVE SLEEP APNEA): Status: ACTIVE | Noted: 2021-01-22

## 2021-01-22 PROBLEM — E55.9 VITAMIN D DEFICIENCY: Status: ACTIVE | Noted: 2021-01-22

## 2021-01-22 PROBLEM — E53.8 VITAMIN B12 DEFICIENCY: Status: ACTIVE | Noted: 2021-01-22

## 2021-01-22 PROBLEM — I10 HTN (HYPERTENSION): Status: ACTIVE | Noted: 2017-09-22

## 2021-01-22 LAB
ANION GAP SERPL CALCULATED.3IONS-SCNC: 10 MMOL/L (ref 5–15)
BUN SERPL-MCNC: 12 MG/DL (ref 8–23)
BUN/CREAT SERPL: 11 (ref 7–25)
CALCIUM SPEC-SCNC: 9.4 MG/DL (ref 8.6–10.5)
CHLORIDE SERPL-SCNC: 112 MMOL/L (ref 98–107)
CO2 SERPL-SCNC: 22 MMOL/L (ref 22–29)
CREAT SERPL-MCNC: 1.09 MG/DL (ref 0.57–1)
DEPRECATED RDW RBC AUTO: 42.8 FL (ref 37–54)
ERYTHROCYTE [DISTWIDTH] IN BLOOD BY AUTOMATED COUNT: 14.1 % (ref 12.3–15.4)
GFR SERPL CREATININE-BSD FRML MDRD: 59 ML/MIN/1.73
GLUCOSE SERPL-MCNC: 80 MG/DL (ref 65–99)
HCT VFR BLD AUTO: 41 % (ref 34–46.6)
HGB BLD-MCNC: 13.5 G/DL (ref 12–15.9)
MCH RBC QN AUTO: 27.9 PG (ref 26.6–33)
MCHC RBC AUTO-ENTMCNC: 32.9 G/DL (ref 31.5–35.7)
MCV RBC AUTO: 84.7 FL (ref 79–97)
PLATELET # BLD AUTO: 192 10*3/MM3 (ref 140–450)
PMV BLD AUTO: 10.1 FL (ref 6–12)
POTASSIUM SERPL-SCNC: 3.4 MMOL/L (ref 3.5–5.2)
RBC # BLD AUTO: 4.84 10*6/MM3 (ref 3.77–5.28)
SODIUM SERPL-SCNC: 144 MMOL/L (ref 136–145)
WBC # BLD AUTO: 3.46 10*3/MM3 (ref 3.4–10.8)

## 2021-01-22 PROCEDURE — G0378 HOSPITAL OBSERVATION PER HR: HCPCS

## 2021-01-22 PROCEDURE — 25010000002 PROPOFOL 10 MG/ML EMULSION: Performed by: ANESTHESIOLOGY

## 2021-01-22 PROCEDURE — 25010000002 MIDAZOLAM PER 1 MG: Performed by: ANESTHESIOLOGY

## 2021-01-22 PROCEDURE — 80048 BASIC METABOLIC PNL TOTAL CA: CPT | Performed by: NURSE PRACTITIONER

## 2021-01-22 PROCEDURE — 96375 TX/PRO/DX INJ NEW DRUG ADDON: CPT

## 2021-01-22 PROCEDURE — C1726 CATH, BAL DIL, NON-VASCULAR: HCPCS | Performed by: INTERNAL MEDICINE

## 2021-01-22 PROCEDURE — 25010000002 HYDRALAZINE PER 20 MG: Performed by: STUDENT IN AN ORGANIZED HEALTH CARE EDUCATION/TRAINING PROGRAM

## 2021-01-22 PROCEDURE — 99202 OFFICE O/P NEW SF 15 MIN: CPT | Performed by: INTERNAL MEDICINE

## 2021-01-22 PROCEDURE — 96376 TX/PRO/DX INJ SAME DRUG ADON: CPT

## 2021-01-22 PROCEDURE — 85027 COMPLETE CBC AUTOMATED: CPT | Performed by: NURSE PRACTITIONER

## 2021-01-22 PROCEDURE — 43249 ESOPH EGD DILATION <30 MM: CPT | Performed by: INTERNAL MEDICINE

## 2021-01-22 PROCEDURE — 96361 HYDRATE IV INFUSION ADD-ON: CPT

## 2021-01-22 RX ORDER — SUCRALFATE 1 G/1
1 TABLET ORAL
Qty: 120 TABLET | Refills: 0 | Status: SHIPPED | OUTPATIENT
Start: 2021-01-22 | End: 2021-02-21

## 2021-01-22 RX ORDER — PROMETHAZINE HYDROCHLORIDE 25 MG/1
25 SUPPOSITORY RECTAL ONCE AS NEEDED
Status: DISCONTINUED | OUTPATIENT
Start: 2021-01-22 | End: 2021-01-22

## 2021-01-22 RX ORDER — DILTIAZEM HYDROCHLORIDE 240 MG/1
240 CAPSULE, COATED, EXTENDED RELEASE ORAL
Status: DISCONTINUED | OUTPATIENT
Start: 2021-01-22 | End: 2021-01-22 | Stop reason: HOSPADM

## 2021-01-22 RX ORDER — HYDRALAZINE HYDROCHLORIDE 20 MG/ML
5 INJECTION INTRAMUSCULAR; INTRAVENOUS
Status: DISCONTINUED | OUTPATIENT
Start: 2021-01-22 | End: 2021-01-22

## 2021-01-22 RX ORDER — FLUMAZENIL 0.1 MG/ML
0.2 INJECTION INTRAVENOUS AS NEEDED
Status: DISCONTINUED | OUTPATIENT
Start: 2021-01-22 | End: 2021-01-22

## 2021-01-22 RX ORDER — HYDRALAZINE HYDROCHLORIDE 20 MG/ML
10 INJECTION INTRAMUSCULAR; INTRAVENOUS EVERY 6 HOURS PRN
Status: DISCONTINUED | OUTPATIENT
Start: 2021-01-22 | End: 2021-01-22 | Stop reason: HOSPADM

## 2021-01-22 RX ORDER — SODIUM CHLORIDE 9 MG/ML
100 INJECTION, SOLUTION INTRAVENOUS CONTINUOUS
Status: DISCONTINUED | OUTPATIENT
Start: 2021-01-22 | End: 2021-01-22 | Stop reason: HOSPADM

## 2021-01-22 RX ORDER — DIPHENHYDRAMINE HCL 25 MG
25 CAPSULE ORAL
Status: DISCONTINUED | OUTPATIENT
Start: 2021-01-22 | End: 2021-01-22

## 2021-01-22 RX ORDER — OXYCODONE AND ACETAMINOPHEN 7.5; 325 MG/1; MG/1
1 TABLET ORAL ONCE AS NEEDED
Status: DISCONTINUED | OUTPATIENT
Start: 2021-01-22 | End: 2021-01-22

## 2021-01-22 RX ORDER — CLONIDINE HYDROCHLORIDE 0.1 MG/1
0.1 TABLET ORAL AS NEEDED
Status: ON HOLD | COMMUNITY
Start: 2020-11-02 | End: 2021-01-22

## 2021-01-22 RX ORDER — TORSEMIDE 10 MG/1
5 TABLET ORAL DAILY
Status: DISCONTINUED | OUTPATIENT
Start: 2021-01-22 | End: 2021-01-22 | Stop reason: HOSPADM

## 2021-01-22 RX ORDER — ONDANSETRON 2 MG/ML
4 INJECTION INTRAMUSCULAR; INTRAVENOUS EVERY 6 HOURS PRN
Status: DISCONTINUED | OUTPATIENT
Start: 2021-01-22 | End: 2021-01-22 | Stop reason: HOSPADM

## 2021-01-22 RX ORDER — ONDANSETRON 2 MG/ML
4 INJECTION INTRAMUSCULAR; INTRAVENOUS ONCE AS NEEDED
Status: DISCONTINUED | OUTPATIENT
Start: 2021-01-22 | End: 2021-01-22

## 2021-01-22 RX ORDER — SODIUM CHLORIDE 0.9 % (FLUSH) 0.9 %
10 SYRINGE (ML) INJECTION AS NEEDED
Status: DISCONTINUED | OUTPATIENT
Start: 2021-01-22 | End: 2021-01-22 | Stop reason: HOSPADM

## 2021-01-22 RX ORDER — PANTOPRAZOLE SODIUM 40 MG/1
40 TABLET, DELAYED RELEASE ORAL
Status: DISCONTINUED | OUTPATIENT
Start: 2021-01-22 | End: 2021-01-22 | Stop reason: HOSPADM

## 2021-01-22 RX ORDER — PROPOFOL 10 MG/ML
VIAL (ML) INTRAVENOUS AS NEEDED
Status: DISCONTINUED | OUTPATIENT
Start: 2021-01-22 | End: 2021-01-22 | Stop reason: SURG

## 2021-01-22 RX ORDER — ACETAMINOPHEN 160 MG/5ML
650 SOLUTION ORAL EVERY 4 HOURS PRN
Status: DISCONTINUED | OUTPATIENT
Start: 2021-01-22 | End: 2021-01-22 | Stop reason: HOSPADM

## 2021-01-22 RX ORDER — PROMETHAZINE HYDROCHLORIDE 25 MG/1
25 TABLET ORAL ONCE AS NEEDED
Status: DISCONTINUED | OUTPATIENT
Start: 2021-01-22 | End: 2021-01-22

## 2021-01-22 RX ORDER — LOTEPREDNOL ETABONATE 5 MG/ML
1 SUSPENSION/ DROPS OPHTHALMIC DAILY
COMMUNITY

## 2021-01-22 RX ORDER — HYDROMORPHONE HYDROCHLORIDE 1 MG/ML
0.5 INJECTION, SOLUTION INTRAMUSCULAR; INTRAVENOUS; SUBCUTANEOUS
Status: DISCONTINUED | OUTPATIENT
Start: 2021-01-22 | End: 2021-01-22

## 2021-01-22 RX ORDER — LIDOCAINE HYDROCHLORIDE 20 MG/ML
INJECTION, SOLUTION INFILTRATION; PERINEURAL AS NEEDED
Status: DISCONTINUED | OUTPATIENT
Start: 2021-01-22 | End: 2021-01-22 | Stop reason: SURG

## 2021-01-22 RX ORDER — PANTOPRAZOLE SODIUM 40 MG/10ML
40 INJECTION, POWDER, LYOPHILIZED, FOR SOLUTION INTRAVENOUS
Status: DISCONTINUED | OUTPATIENT
Start: 2021-01-22 | End: 2021-01-22 | Stop reason: HOSPADM

## 2021-01-22 RX ORDER — ACETAMINOPHEN 325 MG/1
650 TABLET ORAL EVERY 4 HOURS PRN
Status: DISCONTINUED | OUTPATIENT
Start: 2021-01-22 | End: 2021-01-22 | Stop reason: HOSPADM

## 2021-01-22 RX ORDER — HYDROCODONE BITARTRATE AND ACETAMINOPHEN 7.5; 325 MG/1; MG/1
1 TABLET ORAL ONCE AS NEEDED
Status: DISCONTINUED | OUTPATIENT
Start: 2021-01-22 | End: 2021-01-22

## 2021-01-22 RX ORDER — DIPHENHYDRAMINE HYDROCHLORIDE 50 MG/ML
12.5 INJECTION INTRAMUSCULAR; INTRAVENOUS
Status: DISCONTINUED | OUTPATIENT
Start: 2021-01-22 | End: 2021-01-22

## 2021-01-22 RX ORDER — PREDNISOLONE ACETATE 10 MG/ML
1 SUSPENSION/ DROPS OPHTHALMIC DAILY
Status: DISCONTINUED | OUTPATIENT
Start: 2021-01-22 | End: 2021-01-22 | Stop reason: HOSPADM

## 2021-01-22 RX ORDER — FENTANYL CITRATE 50 UG/ML
50 INJECTION, SOLUTION INTRAMUSCULAR; INTRAVENOUS
Status: DISCONTINUED | OUTPATIENT
Start: 2021-01-22 | End: 2021-01-22

## 2021-01-22 RX ORDER — NALOXONE HCL 0.4 MG/ML
0.2 VIAL (ML) INJECTION AS NEEDED
Status: DISCONTINUED | OUTPATIENT
Start: 2021-01-22 | End: 2021-01-22

## 2021-01-22 RX ORDER — FAMOTIDINE 10 MG/ML
20 INJECTION, SOLUTION INTRAVENOUS ONCE
Status: COMPLETED | OUTPATIENT
Start: 2021-01-22 | End: 2021-01-22

## 2021-01-22 RX ORDER — METOPROLOL SUCCINATE 25 MG/1
25 TABLET, EXTENDED RELEASE ORAL DAILY
COMMUNITY
Start: 2020-12-02

## 2021-01-22 RX ORDER — PANTOPRAZOLE SODIUM 40 MG/1
40 TABLET, DELAYED RELEASE ORAL
Qty: 60 TABLET | Refills: 1 | Status: SHIPPED | OUTPATIENT
Start: 2021-01-22 | End: 2021-03-23

## 2021-01-22 RX ORDER — MIDAZOLAM HYDROCHLORIDE 1 MG/ML
1 INJECTION INTRAMUSCULAR; INTRAVENOUS
Status: DISCONTINUED | OUTPATIENT
Start: 2021-01-22 | End: 2021-01-22 | Stop reason: HOSPADM

## 2021-01-22 RX ORDER — SUCRALFATE 1 G/1
1 TABLET ORAL
Status: DISCONTINUED | OUTPATIENT
Start: 2021-01-22 | End: 2021-01-22 | Stop reason: HOSPADM

## 2021-01-22 RX ORDER — PROPOFOL 10 MG/ML
VIAL (ML) INTRAVENOUS CONTINUOUS PRN
Status: DISCONTINUED | OUTPATIENT
Start: 2021-01-22 | End: 2021-01-22 | Stop reason: SURG

## 2021-01-22 RX ORDER — SODIUM CHLORIDE 0.9 % (FLUSH) 0.9 %
10 SYRINGE (ML) INJECTION EVERY 12 HOURS SCHEDULED
Status: DISCONTINUED | OUTPATIENT
Start: 2021-01-22 | End: 2021-01-22 | Stop reason: HOSPADM

## 2021-01-22 RX ORDER — HYDRALAZINE HYDROCHLORIDE 20 MG/ML
10 INJECTION INTRAMUSCULAR; INTRAVENOUS 3 TIMES DAILY
Status: DISCONTINUED | OUTPATIENT
Start: 2021-01-22 | End: 2021-01-22

## 2021-01-22 RX ORDER — ACETAMINOPHEN 650 MG/1
650 SUPPOSITORY RECTAL EVERY 4 HOURS PRN
Status: DISCONTINUED | OUTPATIENT
Start: 2021-01-22 | End: 2021-01-22 | Stop reason: HOSPADM

## 2021-01-22 RX ORDER — LABETALOL HYDROCHLORIDE 5 MG/ML
5 INJECTION, SOLUTION INTRAVENOUS
Status: DISCONTINUED | OUTPATIENT
Start: 2021-01-22 | End: 2021-01-22

## 2021-01-22 RX ORDER — TIMOLOL MALEATE 5 MG/ML
1 SOLUTION/ DROPS OPHTHALMIC DAILY
Status: DISCONTINUED | OUTPATIENT
Start: 2021-01-22 | End: 2021-01-22 | Stop reason: HOSPADM

## 2021-01-22 RX ORDER — SODIUM CHLORIDE, SODIUM LACTATE, POTASSIUM CHLORIDE, CALCIUM CHLORIDE 600; 310; 30; 20 MG/100ML; MG/100ML; MG/100ML; MG/100ML
9 INJECTION, SOLUTION INTRAVENOUS CONTINUOUS
Status: DISCONTINUED | OUTPATIENT
Start: 2021-01-22 | End: 2021-01-22 | Stop reason: HOSPADM

## 2021-01-22 RX ORDER — LABETALOL 200 MG/1
200 TABLET, FILM COATED ORAL 2 TIMES DAILY
Status: DISCONTINUED | OUTPATIENT
Start: 2021-01-22 | End: 2021-01-22 | Stop reason: HOSPADM

## 2021-01-22 RX ORDER — EPHEDRINE SULFATE 50 MG/ML
5 INJECTION, SOLUTION INTRAVENOUS ONCE AS NEEDED
Status: DISCONTINUED | OUTPATIENT
Start: 2021-01-22 | End: 2021-01-22

## 2021-01-22 RX ORDER — ACYCLOVIR 400 MG/1
400 TABLET ORAL DAILY
Status: DISCONTINUED | OUTPATIENT
Start: 2021-01-22 | End: 2021-01-22 | Stop reason: HOSPADM

## 2021-01-22 RX ADMIN — FAMOTIDINE 20 MG: 10 INJECTION INTRAVENOUS at 09:49

## 2021-01-22 RX ADMIN — HYDRALAZINE HYDROCHLORIDE 10 MG: 20 INJECTION, SOLUTION INTRAMUSCULAR; INTRAVENOUS at 09:50

## 2021-01-22 RX ADMIN — MIDAZOLAM 1 MG: 1 INJECTION INTRAMUSCULAR; INTRAVENOUS at 10:36

## 2021-01-22 RX ADMIN — SODIUM CHLORIDE, POTASSIUM CHLORIDE, SODIUM LACTATE AND CALCIUM CHLORIDE: 600; 310; 30; 20 INJECTION, SOLUTION INTRAVENOUS at 10:51

## 2021-01-22 RX ADMIN — PROPOFOL 100 MG: 10 INJECTION, EMULSION INTRAVENOUS at 10:54

## 2021-01-22 RX ADMIN — TIMOLOL MALEATE 1 DROP: 5 SOLUTION/ DROPS OPHTHALMIC at 07:34

## 2021-01-22 RX ADMIN — PANTOPRAZOLE SODIUM 40 MG: 40 INJECTION, POWDER, FOR SOLUTION INTRAVENOUS at 05:27

## 2021-01-22 RX ADMIN — SODIUM CHLORIDE, POTASSIUM CHLORIDE, SODIUM LACTATE AND CALCIUM CHLORIDE: 600; 310; 30; 20 INJECTION, SOLUTION INTRAVENOUS at 10:50

## 2021-01-22 RX ADMIN — PROPOFOL 300 MCG/KG/MIN: 10 INJECTION, EMULSION INTRAVENOUS at 10:54

## 2021-01-22 RX ADMIN — SODIUM CHLORIDE, PRESERVATIVE FREE 10 ML: 5 INJECTION INTRAVENOUS at 00:20

## 2021-01-22 RX ADMIN — SODIUM CHLORIDE 100 ML/HR: 9 INJECTION, SOLUTION INTRAVENOUS at 00:20

## 2021-01-22 RX ADMIN — LIDOCAINE HYDROCHLORIDE 100 MG: 20 INJECTION, SOLUTION INFILTRATION; PERINEURAL at 10:52

## 2021-01-22 NOTE — ED NOTES
Pt son came to triage to check status of pt. The provider offered to come to triage to speak to son.        Eli Hong, RN  01/21/21 0642

## 2021-01-22 NOTE — PROGRESS NOTES
Continued Stay Note  [facility]     Patient Name: Loren Buenrostro  MRN: [id number]  Today's Date: 1/22/2021    Admit Date: 1/21/2021    Discharge Plan     Row Name 01/22/21 1340       Plan    Plan  Home no needs    Plan Comments  Discharge order noted. Met with patient who confirmed DC plan is home. Stated family will assist as needed and her daughter will provide transportation at DC. Denies any needs/.equipment.        Discharge Codes    No documentation.       Expected Discharge Date and Time     Expected Discharge Date Expected Discharge Time    Jan 22, 2021             Kristina Butler RN

## 2021-01-22 NOTE — PROGRESS NOTES
Case Management Discharge Note      Final Note: Discharged home. Kristina Butler, CHIVO         Transportation Services  Private: Car    Final Discharge Disposition Code: 01 - home or self-care

## 2021-01-22 NOTE — DISCHARGE SUMMARY
PHYSICIAN DISCHARGE SUMMARY                                                                        [facility]    Patient Identification:  Name: Loren Buenrostro  Age: 80 y.o.  Sex: female  :  1940  MRN: [id number]  Primary Care Physician: Génesis Alex APRN    Admit date: 2021  Discharge date and time:2021  Discharged Condition: good    Discharge Diagnoses:  Active Hospital Problems    Diagnosis  POA   • **Dysphagia [R13.10]  Yes   • CKD (chronic kidney disease) stage 3, GFR 30-59 ml/min (CMS/[facility]) [N18.30]  Yes   • WAYNE (obstructive sleep apnea) [G47.33]  Yes   • Vitamin B12 deficiency [E53.8]  Yes   • Vitamin D deficiency [E55.9]  Yes   • Esophageal stricture [K22.2]  Unknown   • Difficulty swallowing liquids [R13.10]  Unknown   • Esophageal dysphagia [R13.10]  Unknown   • HTN (hypertension) [I10]  Yes      Resolved Hospital Problems   No resolved problems to display.          PMHX:   Past Medical History:   Diagnosis Date   • Abdominal pain, epigastric    • Abnormal weight loss    • Anemia    • Anemia of chronic disease    • Benign colon polyp    • Bloating    • Blunt head trauma    • Cardiomegaly    • Change in bowel movement    • Chest pain    • Coarse tremors    • Constipation    • Contact dermatitis    • Coronary artery disease    • Dermatitis    • Dyspnea    • Ear noise/buzzing    • Edema    • Encounter for screening mammogram for breast cancer    • Epistaxis    • Esophageal stricture    • Fatigue    • GERD (gastroesophageal reflux disease)    • GI bleed    • Glaucoma    • Gout    • Heart disease    • Hernia    • Herpes    • Herpes simplex    • History of colon polyps    • Hospital discharge follow-up    • Hypertension    • Ingrown toenail    • Initial Medicare annual wellness visit    • Insomnia    • Iron deficiency anemia    • Leucopenia    • Medicare annual wellness visit, subsequent    • Muscle spasm     • Myocardial infarction (CMS/HCC)     2002- stent placed   • OA (osteoarthritis)    • Post-menopausal    • Rash    • Renal artery stenosis (CMS/HCC)    • Renal disorder    • Scalp pruritus    • Sciatica of left side    • Seasonal allergies    • Seborrheic dermatitis    • Sinusitis    • Sleep apnea, obstructive    • Stage 3 chronic kidney disease (CMS/HCC)     HISTORY OF   • 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)    • Syncope    • UTI (urinary tract infection)    • Vitamin B12 deficiency    • Vitamin D deficiency      PSHX:   Past Surgical History:   Procedure Laterality Date   • COLONOSCOPY  11/07/2012    nonthrombosed external hemorrhoids,diverticulosis in the ascending colon, no-bleeding internal hemmorrhoids   • COLONOSCOPY  11/13/2014    internal/external hemorrhoids,diverticulosis sigmoid, descending, transverse, ascending, 2mm polyp transverse (BX)   • CORONARY ANGIOPLASTY  2002    cath laser angioplasty of lesion 2   • CYST REMOVAL     • TRANSLUMINAL ANGIOPLASTY     • UPPER GASTROINTESTINAL ENDOSCOPY  11/13/2014    benign appearing stricture dilated, erythematous mucosa antrum (BX)       Hospital Course: Loren Buenrostro  is a 80 y.o. female with a history of HTN, CKD3, WAYNE, CAD, vitamin D and B12 deficiency, that presents to [facility] complaining of difficulty swallowing. Patient reports that she took her pills this morning at about 9 am, felt like one pill got stuck and had trouble getting it down but then experienced difficulty swallowing after that point. She reports that she was unable to keep water down, this has persisted all day and additionally reports increased gas/belching today. Patient denies history of this in the past. She denies fever, chest pain, shortness of breath, abdominal pain, changes in bowel or bladder habits, edema. She was reportedly able to swallow water while in ED tonight, but was unable to swallow applesauce she was given. Labs  done tonight were unremarkable and CXR was negative. ED provider spoke with GI who agreed to take patient to OR for endo in the morning. Patient was given IV Pepcid in ED and reports relief of her gas since that time.         The patient was admitted to the hospital and seen by GI medicine.  Patient underwent EGD was found to have stricture of the esophagus and had esophageal dilatation.  There was some ulceration probably in the area where some food had been impacted in her esophagus.  The patient was started on Protonix and Carafate.  She is able to tolerate a soft diet and looked well enough to go home.  We will follow-up with her primary care in 1 week and also follow-up with GI medicine.    Consults:     Consults     Date and Time Order Name Status Description    1/22/2021 0006 Inpatient Gastroenterology Consult Completed     1/21/2021 2021 [facility] (on-call MD unless specified) Details Completed     1/21/2021 1948 Gastroenterology (on-call MD unless specified) Completed         Results from last 7 days   Lab Units 01/22/21  0747   WBC 10*3/mm3 3.46   HEMOGLOBIN g/dL 13.5   HEMATOCRIT % 41.0   PLATELETS 10*3/mm3 192     Results from last 7 days   Lab Units 01/22/21  0747   SODIUM mmol/L 144   POTASSIUM mmol/L 3.4*   CHLORIDE mmol/L 112*   CO2 mmol/L 22.0   BUN mg/dL 12   CREATININE mg/dL 1.09*   GLUCOSE mg/dL 80   CALCIUM mg/dL 9.4     Significant Diagnostic Studies:   WBC   Date Value Ref Range Status   01/22/2021 3.46 3.40 - 10.80 10*3/mm3 Final     Hemoglobin   Date Value Ref Range Status   01/22/2021 13.5 12.0 - 15.9 g/dL Final     Hematocrit   Date Value Ref Range Status   01/22/2021 41.0 34.0 - 46.6 % Final     Platelets   Date Value Ref Range Status   01/22/2021 192 140 - 450 10*3/mm3 Final     Sodium   Date Value Ref Range Status   01/22/2021 144 136 - 145 mmol/L Final     Potassium   Date Value Ref Range Status   01/22/2021 3.4 (L) 3.5 - 5.2 mmol/L Final     Chloride   Date Value Ref Range Status    01/22/2021 112 (H) 98 - 107 mmol/L Final     CO2   Date Value Ref Range Status   01/22/2021 22.0 22.0 - 29.0 mmol/L Final     BUN   Date Value Ref Range Status   01/22/2021 12 8 - 23 mg/dL Final     Creatinine   Date Value Ref Range Status   01/22/2021 1.09 (H) 0.57 - 1.00 mg/dL Final     Glucose   Date Value Ref Range Status   01/22/2021 80 65 - 99 mg/dL Final     Calcium   Date Value Ref Range Status   01/22/2021 9.4 8.6 - 10.5 mg/dL Final     AST (SGOT)   Date Value Ref Range Status   01/21/2021 14 1 - 32 U/L Final     ALT (SGPT)   Date Value Ref Range Status   01/21/2021 11 1 - 33 U/L Final     Alkaline Phosphatase   Date Value Ref Range Status   01/21/2021 72 39 - 117 U/L Final     No results found for: APTT, INR  No results found for: COLORU, CLARITYU, SPECGRAV, PHUR, PROTEINUR, GLUCOSEU, KETONESU, BLOODU, NITRITE, LEUKOCYTESUR, BILIRUBINUR, UROBILINOGEN, RBCUA, WBCUA, BACTERIA, UACOMMENT  Troponin T   Date Value Ref Range Status   01/21/2021 <0.010 0.000 - 0.030 ng/mL Final     No components found for: HGBA1C;2  No components found for: TSH;2  Imaging Results (All)     Procedure Component Value Units Date/Time    XR Chest 2 View [[id number]] Collected: 01/21/21 1846     Updated: 01/21/21 1850    Narrative:      XR CHEST 2 VW-  1/21/2021     HISTORY: Chest pain.     Heart size is within normal limits. Lungs appear free of acute  infiltrates. There are minor degenerative changes in the spine.       Impression:      No acute process.     This report was finalized on 1/21/2021 6:47 PM by Dr. Carlos Fowler M.D.           Lab Results (last 7 days)     Procedure Component Value Units Date/Time    Basic Metabolic Panel [[id number]]  (Abnormal) Collected: 01/22/21 0747    Specimen: Blood Updated: 01/22/21 0833     Glucose 80 mg/dL      BUN 12 mg/dL      Creatinine 1.09 mg/dL      Sodium 144 mmol/L      Potassium 3.4 mmol/L      Chloride 112 mmol/L      CO2 22.0 mmol/L      Calcium 9.4 mg/dL      eGFR  African  Amer 59 mL/min/1.73      BUN/Creatinine Ratio 11.0     Anion Gap 10.0 mmol/L     Narrative:      GFR Normal >60  Chronic Kidney Disease <60  Kidney Failure <15      CBC (No Diff) [[id number]]  (Normal) Collected: 01/22/21 0747    Specimen: Blood Updated: 01/22/21 0809     WBC 3.46 10*3/mm3      RBC 4.84 10*6/mm3      Hemoglobin 13.5 g/dL      Hematocrit 41.0 %      MCV 84.7 fL      MCH 27.9 pg      MCHC 32.9 g/dL      RDW 14.1 %      RDW-SD 42.8 fl      MPV 10.1 fL      Platelets 192 10*3/mm3     COVID PRE-OP / PRE-PROCEDURE SCREENING ORDER (NO ISOLATION) - Swab, Nasopharynx [[id number]]  (Normal) Collected: 01/21/21 2012    Specimen: Swab from Nasopharynx Updated: 01/21/21 2115    Narrative:      The following orders were created for panel order COVID PRE-OP / PRE-PROCEDURE SCREENING ORDER (NO ISOLATION) - Swab, Nasopharynx.  Procedure                               Abnormality         Status                     ---------                               -----------         ------                     COVID-19,[facility]...[[id number]]  Normal              Final result                 Please view results for these tests on the individual orders.    COVID-19,[facility] CEPHEID, NP SWAB IN TRANSPORT MEDIA 8-12 HR TAT - Swab, Nasopharynx [[id number]]  (Normal) Collected: 01/21/21 2012    Specimen: Swab from Nasopharynx Updated: 01/21/21 2115     COVID19 Not Detected    Narrative:      Fact sheet for providers: https://www.fda.gov/media/948205/download     Fact sheet for patients: https://www.fda.gov/media/775873/download    Ord Draw [[id number]] Collected: 01/21/21 1816    Specimen: Blood Updated: 01/21/21 1930    Narrative:      The following orders were created for panel order Ord Draw.  Procedure                               Abnormality         Status                     ---------                               -----------         ------                     Light Blue Top[[id number]]                                    Final result               Green Top (Gel)[[id number]]                                  Final result               Lavender Top[[id number]]                                     Final result               Gold Top - SST[[id number]]                                   Final result                 Please view results for these tests on the individual orders.    Light Blue Top [[id number]] Collected: 01/21/21 1816    Specimen: Blood Updated: 01/21/21 1930     Extra Tube hold for add-on     Comment: Auto resulted       Green Top (Gel) [[id number]] Collected: 01/21/21 1816    Specimen: Blood Updated: 01/21/21 1930     Extra Tube Hold for add-ons.     Comment: Auto resulted.       Gold Top - SST [[id number]] Collected: 01/21/21 1816    Specimen: Blood Updated: 01/21/21 1930     Extra Tube Hold for add-ons.     Comment: Auto resulted.       Lavender Top [[id number]] Collected: 01/21/21 1816    Specimen: Blood Updated: 01/21/21 1930     Extra Tube hold for add-on     Comment: Auto resulted       Comprehensive Metabolic Panel [[id number]]  (Abnormal) Collected: 01/21/21 1816    Specimen: Blood Updated: 01/21/21 1844     Glucose 92 mg/dL      BUN 13 mg/dL      Creatinine 1.31 mg/dL      Sodium 144 mmol/L      Potassium 3.9 mmol/L      Chloride 108 mmol/L      CO2 27.2 mmol/L      Calcium 9.6 mg/dL      Total Protein 6.4 g/dL      Albumin 3.70 g/dL      ALT (SGPT) 11 U/L      AST (SGOT) 14 U/L      Alkaline Phosphatase 72 U/L      Total Bilirubin 0.4 mg/dL      eGFR  African Amer 47 mL/min/1.73      Globulin 2.7 gm/dL      A/G Ratio 1.4 g/dL      BUN/Creatinine Ratio 9.9     Anion Gap 8.8 mmol/L     Narrative:      GFR Normal >60  Chronic Kidney Disease <60  Kidney Failure <15      Troponin [[id number]]  (Normal) Collected: 01/21/21 1816    Specimen: Blood Updated: 01/21/21 1844     Troponin T <0.010 ng/mL     Narrative:      Troponin T Reference Range:  <= 0.03 ng/mL-   Negative for AMI  >0.03 ng/mL-     Abnormal for myocardial  "necrosis.  Clinicians would have to utilize clinical acumen, EKG, Troponin and serial changes to determine if it is an Acute Myocardial Infarction or myocardial injury due to an underlying chronic condition.       Results may be falsely decreased if patient taking Biotin.      CBC & Differential [[id number]]  (Normal) Collected: 01/21/21 1816    Specimen: Blood Updated: 01/21/21 1831    Narrative:      The following orders were created for panel order CBC & Differential.  Procedure                               Abnormality         Status                     ---------                               -----------         ------                     CBC Auto Differential[[id number]]        Normal              Final result                 Please view results for these tests on the individual orders.    CBC Auto Differential [[id number]]  (Normal) Collected: 01/21/21 1816    Specimen: Blood Updated: 01/21/21 1831     WBC 4.18 10*3/mm3      RBC 4.74 10*6/mm3      Hemoglobin 13.1 g/dL      Hematocrit 39.3 %      MCV 82.9 fL      MCH 27.6 pg      MCHC 33.3 g/dL      RDW 14.2 %      RDW-SD 42.3 fl      MPV 9.8 fL      Platelets 196 10*3/mm3      Neutrophil % 58.6 %      Lymphocyte % 28.7 %      Monocyte % 9.8 %      Eosinophil % 2.2 %      Basophil % 0.5 %      Immature Grans % 0.2 %      Neutrophils, Absolute 2.45 10*3/mm3      Lymphocytes, Absolute 1.20 10*3/mm3      Monocytes, Absolute 0.41 10*3/mm3      Eosinophils, Absolute 0.09 10*3/mm3      Basophils, Absolute 0.02 10*3/mm3      Immature Grans, Absolute 0.01 10*3/mm3      nRBC 0.0 /100 WBC         /80 (BP Location: Left arm, Patient Position: Lying)   Pulse 85   Temp 97.5 °F (36.4 °C) (Oral)   Resp 16   Ht 152.4 cm (60\")   Wt 69.9 kg (154 lb)   SpO2 99%   BMI 30.08 kg/m²     Discharge Exam:  General Appearance:    Alert, cooperative, no distress                          Head:    Normocephalic, without obvious abnormality, atraumatic                          " Eyes:                            Throat:   Lips, tongue, gums normal                          Neck:   Supple, symmetrical, trachea midline, no JVD                        Lungs:     Clear to auscultation bilaterally, respirations unlabored                Chest Wall:    No tenderness or deformity                        Heart:    Regular rate and rhythm, S1 and S2 normal, no murmur,no  Rub                                       or gallop                  Abdomen:     Soft, non-tender, bowel sounds active, no masses, no                                                        organomegaly                  Extremities:   Extremities normal, atraumatic, no cyanosis or edema                             Skin:   Skin is warm and dry,  no rashes or palpable lesions                  Neurologic:   no focal deficits noted     Disposition:  Home    Patient Instructions:      Discharge Medications      New Medications      Instructions Start Date   pantoprazole 40 MG EC tablet  Commonly known as: PROTONIX   40 mg, Oral, 2 Times Daily Before Meals      sucralfate 1 g tablet  Commonly known as: CARAFATE   1 g, Oral, 4 Times Daily Before Meals & Nightly         Continue These Medications      Instructions Start Date   acyclovir 400 MG tablet  Commonly known as: ZOVIRAX   400 mg, Oral, Daily      cetirizine 10 MG tablet  Commonly known as: zyrTEC   10 mg, Oral, Daily      CLONIDINE HCL PO   Oral      dilTIAZem 240 MG 24 hr capsule  Commonly known as: TIAZAC   120 mg.      hydrALAZINE 25 MG tablet  Commonly known as: APRESOLINE   25 mg, Oral, 3 Times Daily      labetalol 200 MG tablet  Commonly known as: NORMODYNE   200 mg, Oral, 2 Times Daily      loteprednol 0.5 % ophthalmic suspension  Commonly known as: LOTEMAX   1 drop, Both Eyes, Daily      METOPROLOL SUCCINATE PO   Oral      metoprolol succinate XL 25 MG 24 hr tablet  Commonly known as: TOPROL-XL   25 mg, Oral, Daily      potassium chloride 10 MEQ CR tablet   10 mEq, Oral, 2 Times  Daily      timolol 0.5 % ophthalmic solution  Commonly known as: TIMOPTIC   1 drop, Both Eyes, Daily      torsemide 5 MG tablet  Commonly known as: DEMADEX   5 mg, Oral, Daily      vitamin D 1.25 MG (26223 UT) capsule capsule  Commonly known as: ERGOCALCIFEROL  Notes to patient: Stay on your normal schedule   50,000 Units, Oral, Every 7 Days         Stop These Medications    famotidine 20 MG tablet  Commonly known as: PEPCID     raNITIdine 75 MG tablet  Commonly known as: ZANTAC          No future appointments.  Follow-up Information     System, Provider Not In Follow up in 1 week(s).    Contact information:  [facility] [address]             Nicole Johnson MD Follow up.    Specialty: Gastroenterology  Contact information:  [address] [id number] [phone]                 Discharge Order (From admission, onward)     Start     Ordered    01/22/21 1336  Discharge patient  Once     Expected Discharge Date: 01/22/21    Discharge Disposition: Home or Self Care    Physician of Record for Attribution - Please select from Treatment Team: LUIS DANIEL HAILE [[id number]]    Review needed by CMO to determine Physician of Record: No       Question Answer Comment   Physician of Record for Attribution - Please select from Treatment Team LUIS DANIEL HAILE    Review needed by CMO to determine Physician of Record No        01/22/21 9818                Total time spent discharging patient including evaluation,post hospitalization follow up,  medication and post hospitalization instructions and education total time exceeds 30 minutes.    Signed:  Luis Daniel Haile MD  1/22/2021  13:41 EST

## 2021-01-22 NOTE — PROGRESS NOTES
"DAILY PROGRESS NOTE  [facility]    Patient Identification:  Name: Loren Buenrostro  Age: 80 y.o.  Sex: female  :  1940  MRN: [id number]         Primary Care Physician: System, Provider Not In    Subjective:  Interval History:She feels better swallowing good    Objective:    Scheduled Meds:acyclovir, 400 mg, Oral, Daily  dilTIAZem CD, 240 mg, Oral, Q24H  labetalol, 200 mg, Oral, BID  pantoprazole, 40 mg, Oral, BID AC  prednisoLONE acetate, 1 drop, Both Eyes, Daily  sucralfate, 1 g, Oral, 4x Daily AC & at Bedtime  torsemide, 5 mg, Oral, Daily      Continuous Infusions:     Vital signs in last 24 hours:  Temp:  [96.7 °F (35.9 °C)-97.8 °F (36.6 °C)] 97.5 °F (36.4 °C)  Heart Rate:  [54-97] 85  Resp:  [16-18] 16  BP: (120-196)/(63-93) 149/80    Intake/Output:    Intake/Output Summary (Last 24 hours) at 2021 1328  Last data filed at 2021 1153  Gross per 24 hour   Intake 500 ml   Output 1800 ml   Net -1300 ml       Exam:  /80 (BP Location: Left arm, Patient Position: Lying)   Pulse 85   Temp 97.5 °F (36.4 °C) (Oral)   Resp 16   Ht 152.4 cm (60\")   Wt 69.9 kg (154 lb)   SpO2 99%   BMI 30.08 kg/m²     General Appearance:    Alert, cooperative, no distress   Head:    Normocephalic, without obvious abnormality, atraumatic   Eyes:       Throat:   Lips, tongue, gums normal   Neck:   Supple, symmetrical, trachea midline, no JVD   Lungs:     Clear to auscultation bilaterally, respirations unlabored   Chest Wall:    No tenderness or deformity    Heart:    Regular rate and rhythm, S1 and S2 normal, no murmur,no  Rub or gallop   Abdomen:     Soft, nontender, bowel sounds active, no masses, no organomegaly    Extremities:   Extremities normal, atraumatic, no cyanosis or edema   Pulses:      Skin:   Skin is warm and dry,  no rashes or palpable lesions   Neurologic:   no focal deficits noted      Lab Results (last 72 hours)     Procedure Component Value Units Date/Time    Basic Metabolic Panel " [[id number]]  (Abnormal) Collected: 01/22/21 0747    Specimen: Blood Updated: 01/22/21 0833     Glucose 80 mg/dL      BUN 12 mg/dL      Creatinine 1.09 mg/dL      Sodium 144 mmol/L      Potassium 3.4 mmol/L      Chloride 112 mmol/L      CO2 22.0 mmol/L      Calcium 9.4 mg/dL      eGFR  African Amer 59 mL/min/1.73      BUN/Creatinine Ratio 11.0     Anion Gap 10.0 mmol/L     Narrative:      GFR Normal >60  Chronic Kidney Disease <60  Kidney Failure <15      CBC (No Diff) [[id number]]  (Normal) Collected: 01/22/21 0747    Specimen: Blood Updated: 01/22/21 0809     WBC 3.46 10*3/mm3      RBC 4.84 10*6/mm3      Hemoglobin 13.5 g/dL      Hematocrit 41.0 %      MCV 84.7 fL      MCH 27.9 pg      MCHC 32.9 g/dL      RDW 14.1 %      RDW-SD 42.8 fl      MPV 10.1 fL      Platelets 192 10*3/mm3     COVID PRE-OP / PRE-PROCEDURE SCREENING ORDER (NO ISOLATION) - Swab, Nasopharynx [[id number]]  (Normal) Collected: 01/21/21 2012    Specimen: Swab from Nasopharynx Updated: 01/21/21 2115    Narrative:      The following orders were created for panel order COVID PRE-OP / PRE-PROCEDURE SCREENING ORDER (NO ISOLATION) - Swab, Nasopharynx.  Procedure                               Abnormality         Status                     ---------                               -----------         ------                     COVID-19,[facility]...[[id number]]  Normal              Final result                 Please view results for these tests on the individual orders.    COVID-19,[facility] CEPHEID, NP SWAB IN TRANSPORT MEDIA 8-12 HR TAT - Swab, Nasopharynx [[id number]]  (Normal) Collected: 01/21/21 2012    Specimen: Swab from Nasopharynx Updated: 01/21/21 2115     COVID19 Not Detected    Narrative:      Fact sheet for providers: https://www.fda.gov/media/849896/download     Fact sheet for patients: https://www.fda.gov/media/901579/download    [place] Draw [[id number]] Collected: 01/21/21 1816    Specimen: Blood Updated: 01/21/21 1930    Narrative:       The following orders were created for panel order [place] Draw.  Procedure                               Abnormality         Status                     ---------                               -----------         ------                     Light Blue Top[[id number]]                                   Final result               Green Top (Gel)[[id number]]                                  Final result               Lavender Top[[id number]]                                     Final result               Gold Top - SST[[id number]]                                   Final result                 Please view results for these tests on the individual orders.    Light Blue Top [[id number]] Collected: 01/21/21 1816    Specimen: Blood Updated: 01/21/21 1930     Extra Tube hold for add-on     Comment: Auto resulted       Green Top (Gel) [[id number]] Collected: 01/21/21 1816    Specimen: Blood Updated: 01/21/21 1930     Extra Tube Hold for add-ons.     Comment: Auto resulted.       Gold Top - SST [[id number]] Collected: 01/21/21 1816    Specimen: Blood Updated: 01/21/21 1930     Extra Tube Hold for add-ons.     Comment: Auto resulted.       Lavender Top [[id number]] Collected: 01/21/21 1816    Specimen: Blood Updated: 01/21/21 1930     Extra Tube hold for add-on     Comment: Auto resulted       Comprehensive Metabolic Panel [[id number]]  (Abnormal) Collected: 01/21/21 1816    Specimen: Blood Updated: 01/21/21 1844     Glucose 92 mg/dL      BUN 13 mg/dL      Creatinine 1.31 mg/dL      Sodium 144 mmol/L      Potassium 3.9 mmol/L      Chloride 108 mmol/L      CO2 27.2 mmol/L      Calcium 9.6 mg/dL      Total Protein 6.4 g/dL      Albumin 3.70 g/dL      ALT (SGPT) 11 U/L      AST (SGOT) 14 U/L      Alkaline Phosphatase 72 U/L      Total Bilirubin 0.4 mg/dL      eGFR  African Amer 47 mL/min/1.73      Globulin 2.7 gm/dL      A/G Ratio 1.4 g/dL      BUN/Creatinine Ratio 9.9     Anion Gap 8.8 mmol/L     Narrative:      GFR Normal >60  Chronic  Kidney Disease <60  Kidney Failure <15      Troponin [[id number]]  (Normal) Collected: 01/21/21 1816    Specimen: Blood Updated: 01/21/21 1844     Troponin T <0.010 ng/mL     Narrative:      Troponin T Reference Range:  <= 0.03 ng/mL-   Negative for AMI  >0.03 ng/mL-     Abnormal for myocardial necrosis.  Clinicians would have to utilize clinical acumen, EKG, Troponin and serial changes to determine if it is an Acute Myocardial Infarction or myocardial injury due to an underlying chronic condition.       Results may be falsely decreased if patient taking Biotin.      CBC & Differential [[id number]]  (Normal) Collected: 01/21/21 1816    Specimen: Blood Updated: 01/21/21 1831    Narrative:      The following orders were created for panel order CBC & Differential.  Procedure                               Abnormality         Status                     ---------                               -----------         ------                     CBC Auto Differential[[id number]]        Normal              Final result                 Please view results for these tests on the individual orders.    CBC Auto Differential [[id number]]  (Normal) Collected: 01/21/21 1816    Specimen: Blood Updated: 01/21/21 1831     WBC 4.18 10*3/mm3      RBC 4.74 10*6/mm3      Hemoglobin 13.1 g/dL      Hematocrit 39.3 %      MCV 82.9 fL      MCH 27.6 pg      MCHC 33.3 g/dL      RDW 14.2 %      RDW-SD 42.3 fl      MPV 9.8 fL      Platelets 196 10*3/mm3      Neutrophil % 58.6 %      Lymphocyte % 28.7 %      Monocyte % 9.8 %      Eosinophil % 2.2 %      Basophil % 0.5 %      Immature Grans % 0.2 %      Neutrophils, Absolute 2.45 10*3/mm3      Lymphocytes, Absolute 1.20 10*3/mm3      Monocytes, Absolute 0.41 10*3/mm3      Eosinophils, Absolute 0.09 10*3/mm3      Basophils, Absolute 0.02 10*3/mm3      Immature Grans, Absolute 0.01 10*3/mm3      nRBC 0.0 /100 WBC         Data Review:  Results from last 7 days   Lab Units 01/22/21  0747 01/21/21 1816   SODIUM  mmol/L 144 144   POTASSIUM mmol/L 3.4* 3.9   CHLORIDE mmol/L 112* 108*   CO2 mmol/L 22.0 27.2   BUN mg/dL 12 13   CREATININE mg/dL 1.09* 1.31*   GLUCOSE mg/dL 80 92   CALCIUM mg/dL 9.4 9.6     Results from last 7 days   Lab Units 01/22/21  0747 01/21/21  1816   WBC 10*3/mm3 3.46 4.18   HEMOGLOBIN g/dL 13.5 13.1   HEMATOCRIT % 41.0 39.3   PLATELETS 10*3/mm3 192 196             Lab Results   Lab Value Date/Time    TROPONINT <0.010 01/21/2021 1816    TROPONINT <0.010 11/18/2020 1859    TROPONINT <0.010 04/09/2019 0437    TROPONINT <0.010 09/22/2017 1852         Results from last 7 days   Lab Units 01/21/21  1816   ALK PHOS U/L 72   BILIRUBIN mg/dL 0.4   ALT (SGPT) U/L 11   AST (SGOT) U/L 14             No results found for: POCGLU        Past Medical History:   Diagnosis Date   • Abdominal pain, epigastric    • Abnormal weight loss    • Anemia    • Anemia of chronic disease    • Benign colon polyp    • Bloating    • Blunt head trauma    • Cardiomegaly    • Change in bowel movement    • Chest pain    • Coarse tremors    • Constipation    • Contact dermatitis    • Coronary artery disease    • Dermatitis    • Dyspnea    • Ear noise/buzzing    • Edema    • Encounter for screening mammogram for breast cancer    • Epistaxis    • Esophageal stricture    • Fatigue    • GERD (gastroesophageal reflux disease)    • GI bleed    • Glaucoma    • Gout    • Heart disease    • Hernia    • Herpes    • Herpes simplex    • History of colon polyps    • Hospital discharge follow-up    • Hypertension    • Ingrown toenail    • Initial Medicare annual wellness visit    • Insomnia    • Iron deficiency anemia    • Leucopenia    • Medicare annual wellness visit, subsequent    • Muscle spasm    • Myocardial infarction (CMS/HCC)     2002- stent placed   • OA (osteoarthritis)    • Post-menopausal    • Rash    • Renal artery stenosis (CMS/HCC)    • Renal disorder    • Scalp pruritus    • Sciatica of left side    • Seasonal allergies    • Seborrheic  dermatitis    • Sinusitis    • Sleep apnea, obstructive    • Stage 3 chronic kidney disease (CMS/HCC)     HISTORY OF   • 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)    • Syncope    • UTI (urinary tract infection)    • Vitamin B12 deficiency    • Vitamin D deficiency        Assessment:  Active Hospital Problems    Diagnosis  POA   • **Dysphagia [R13.10]  Yes   • CKD (chronic kidney disease) stage 3, GFR 30-59 ml/min (CMS/HCC) [N18.30]  Yes   • WAYNE (obstructive sleep apnea) [G47.33]  Yes   • Vitamin B12 deficiency [E53.8]  Yes   • Vitamin D deficiency [E55.9]  Yes   • Difficulty swallowing liquids [R13.10]  Unknown   • Esophageal dysphagia [R13.10]  Unknown   • HTN (hypertension) [I10]  Yes      Resolved Hospital Problems   No resolved problems to display.       Plan:  As per GI see how she does with diet.     Luis Daniel Haile MD  1/22/2021  13:28 EST

## 2021-01-22 NOTE — H&P
Patient Name:  Loren Buenrostro  YOB: 1940  MRN:  [id number]  Admit Date:  1/21/2021  Patient Care Team:  System, Provider Not In as PCP - General      Chief Complaint   Patient presents with   • Difficulty Swallowing   • Heartburn       Subjective     Ms. Buenrostro is a 80 y.o. female with a history of HTN, CKD3, WAYNE, CAD, vitamin D and B12 deficiency, that presents to [facility] complaining of difficulty swallowing. Patient reports that she took her pills this morning at about 9 am, felt like one pill got stuck and had trouble getting it down but then experienced difficulty swallowing after that point. She reports that she was unable to keep water down, this has persisted all day and additionally reports increased gas/belching today. Patient denies history of this in the past. She denies fever, chest pain, shortness of breath, abdominal pain, changes in bowel or bladder habits, edema. She was reportedly able to swallow water while in ED tonight, but was unable to swallow applesauce she was given. Labs done tonight were unremarkable and CXR was negative. ED provider spoke with GI who agreed to take patient to OR for endo in the morning. Patient was given IV Pepcid in ED and reports relief of her gas since that time.     History of Present Illness    Past Medical History:   Diagnosis Date   • Abdominal pain, epigastric    • Abnormal weight loss    • Anemia    • Anemia of chronic disease    • Benign colon polyp    • Bloating    • Blunt head trauma    • Cardiomegaly    • Change in bowel movement    • Chest pain    • Coarse tremors    • Constipation    • Contact dermatitis    • Coronary artery disease    • Dermatitis    • Dyspnea    • Ear noise/buzzing    • Edema    • Encounter for screening mammogram for breast cancer    • Epistaxis    • Esophageal stricture    • Fatigue    • GERD (gastroesophageal reflux disease)    • GI bleed    • Glaucoma    • Gout    • Heart disease    • Hernia    •  Herpes    • Herpes simplex    • History of colon polyps    • Hospital discharge follow-up    • Hypertension    • Ingrown toenail    • Initial Medicare annual wellness visit    • Insomnia    • Iron deficiency anemia    • Leucopenia    • Medicare annual wellness visit, subsequent    • Muscle spasm    • Myocardial infarction (CMS/HCC)     2002- stent placed   • OA (osteoarthritis)    • Post-menopausal    • Rash    • Renal artery stenosis (CMS/HCC)    • Renal disorder    • Scalp pruritus    • Sciatica of left side    • Seasonal allergies    • Seborrheic dermatitis    • Sinusitis    • Sleep apnea, obstructive    • Stage 3 chronic kidney disease (CMS/[facility])     HISTORY OF   • 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 (CMS/[facility])    • Syncope    • UTI (urinary tract infection)    • Vitamin B12 deficiency    • Vitamin D deficiency      Past Surgical History:   Procedure Laterality Date   • COLONOSCOPY  11/07/2012    nonthrombosed external hemorrhoids,diverticulosis in the ascending colon, no-bleeding internal hemmorrhoids   • COLONOSCOPY  11/13/2014    internal/external hemorrhoids,diverticulosis sigmoid, descending, transverse, ascending, 2mm polyp transverse (BX)   • CORONARY ANGIOPLASTY  2002    cath laser angioplasty of lesion 2   • CYST REMOVAL     • TRANSLUMINAL ANGIOPLASTY     • UPPER GASTROINTESTINAL ENDOSCOPY  11/13/2014    benign appearing stricture dilated, erythematous mucosa antrum (BX)     Family History   Problem Relation Age of Onset   • Coronary artery disease Other    • Hypertension Other    • Diabetes Other    • Migraines Other    • Stroke Other    • Colon cancer Neg Hx    • Colon polyps Neg Hx      Social History     Tobacco Use   • Smoking status: Former Smoker   Substance Use Topics   • Alcohol use: No   • Drug use: No     Medications Prior to Admission   Medication Sig Dispense Refill Last Dose   • acyclovir (ZOVIRAX) 400 MG tablet Take 1 tablet by mouth Daily. 30 tablet 0    •  ATENOLOL+SYRSPEND SF PO Take  by mouth.      • cetirizine (zyrTEC) 10 MG tablet Take 10 mg by mouth Daily.      • CLONIDINE HCL PO Take  by mouth.      • diltiaZEM (TIAZAC) 240 MG 24 hr capsule 120 mg.  5    • famotidine (PEPCID) 20 MG tablet Take 20 mg by mouth 2 (Two) Times a Day.      • METOPROLOL SUCCINATE PO Take  by mouth.      • SPIRONOLACTONE PO Take  by mouth.      • timolol (TIMOPTIC) 0.5 % ophthalmic solution Administer 1 drop to both eyes Daily.      • torsemide (DEMADEX) 5 MG tablet Take 5 mg by mouth Daily.  5    • hydrALAZINE (APRESOLINE) 25 MG tablet Take 25 mg by mouth 3 (Three) Times a Day.      • labetalol (NORMODYNE) 200 MG tablet Take 1 tablet by mouth 2 (Two) Times a Day. 60 tablet 6    • potassium chloride (K-DUR) 10 MEQ CR tablet Take 1 tablet by mouth 2 (Two) Times a Day. 14 tablet 0    • raNITIdine (ZANTAC) 75 MG tablet Take 75 mg by mouth Daily.      • vitamin D (ERGOCALCIFEROL) 72575 units capsule capsule Take 50,000 Units by mouth Every 7 (Seven) Days.        Allergies:    Allergies   Allergen Reactions   • Sulfa Antibiotics        Review of Systems   Constitutional: Negative for chills and fever.   HENT: Negative.  Negative for congestion and sore throat.    Eyes: Negative.  Negative for visual disturbance.   Respiratory: Negative.  Negative for cough and shortness of breath.    Cardiovascular: Negative.  Negative for chest pain and leg swelling.   Gastrointestinal: Negative.  Negative for abdominal pain, nausea and vomiting.   Endocrine: Negative.    Genitourinary: Negative.  Negative for dysuria, frequency and urgency.   Musculoskeletal: Negative.  Negative for arthralgias and myalgias.   Skin: Negative.  Negative for color change and pallor.   Allergic/Immunologic: Negative.    Neurological: Positive for weakness. Negative for dizziness and light-headedness.   Hematological: Negative.    Psychiatric/Behavioral: Negative.  Negative for agitation, behavioral problems and confusion.         Objective    Vital Signs  Temp:  [97.5 °F (36.4 °C)] 97.5 °F (36.4 °C)  Heart Rate:  [54-74] 74  Resp:  [16-18] 16  BP: (142-189)/(68-90) 150/90  SpO2:  [83 %-99 %] 99 %  on   ;   Device (Oxygen Therapy): room air  Body mass index is 30.08 kg/m².    Physical Exam  Vitals signs and nursing note reviewed.   Constitutional:       General: She is not in acute distress.     Appearance: Normal appearance. She is normal weight.   HENT:      Head: Normocephalic and atraumatic.      Mouth/Throat:      Mouth: Mucous membranes are moist.   Eyes:      Extraocular Movements: Extraocular movements intact.   Neck:      Musculoskeletal: Normal range of motion and neck supple. No neck rigidity.   Cardiovascular:      Rate and Rhythm: Regular rhythm. Bradycardia present.      Pulses: Normal pulses.      Heart sounds: Normal heart sounds.   Pulmonary:      Effort: Pulmonary effort is normal. No respiratory distress.      Breath sounds: Normal breath sounds.   Abdominal:      General: Abdomen is flat. Bowel sounds are normal.      Palpations: Abdomen is soft.   Musculoskeletal: Normal range of motion.         General: No swelling.   Skin:     General: Skin is warm and dry.   Neurological:      General: No focal deficit present.      Mental Status: She is alert and oriented to person, place, and time. Mental status is at baseline.   Psychiatric:         Mood and Affect: Mood normal.         Behavior: Behavior normal.         Thought Content: Thought content normal.         Judgment: Judgment normal.         Results Review:   I reviewed the patient's new clinical results including all labs and xrays.    Lab Results (last 24 hours)     Procedure Component Value Units Date/Time    CBC & Differential [[id number]]  (Normal) Collected: 01/21/21 1816    Specimen: Blood Updated: 01/21/21 1831    Narrative:      The following orders were created for panel order CBC & Differential.  Procedure                               Abnormality          Status                     ---------                               -----------         ------                     CBC Auto Differential[[id number]]        Normal              Final result                 Please view results for these tests on the individual orders.    Comprehensive Metabolic Panel [[id number]]  (Abnormal) Collected: 01/21/21 1816    Specimen: Blood Updated: 01/21/21 1844     Glucose 92 mg/dL      BUN 13 mg/dL      Creatinine 1.31 mg/dL      Sodium 144 mmol/L      Potassium 3.9 mmol/L      Chloride 108 mmol/L      CO2 27.2 mmol/L      Calcium 9.6 mg/dL      Total Protein 6.4 g/dL      Albumin 3.70 g/dL      ALT (SGPT) 11 U/L      AST (SGOT) 14 U/L      Alkaline Phosphatase 72 U/L      Total Bilirubin 0.4 mg/dL      eGFR  African Amer 47 mL/min/1.73      Globulin 2.7 gm/dL      A/G Ratio 1.4 g/dL      BUN/Creatinine Ratio 9.9     Anion Gap 8.8 mmol/L     Narrative:      GFR Normal >60  Chronic Kidney Disease <60  Kidney Failure <15      Troponin [[id number]]  (Normal) Collected: 01/21/21 1816    Specimen: Blood Updated: 01/21/21 1844     Troponin T <0.010 ng/mL     Narrative:      Troponin T Reference Range:  <= 0.03 ng/mL-   Negative for AMI  >0.03 ng/mL-     Abnormal for myocardial necrosis.  Clinicians would have to utilize clinical acumen, EKG, Troponin and serial changes to determine if it is an Acute Myocardial Infarction or myocardial injury due to an underlying chronic condition.       Results may be falsely decreased if patient taking Biotin.      CBC Auto Differential [[id number]]  (Normal) Collected: 01/21/21 1816    Specimen: Blood Updated: 01/21/21 1831     WBC 4.18 10*3/mm3      RBC 4.74 10*6/mm3      Hemoglobin 13.1 g/dL      Hematocrit 39.3 %      MCV 82.9 fL      MCH 27.6 pg      MCHC 33.3 g/dL      RDW 14.2 %      RDW-SD 42.3 fl      MPV 9.8 fL      Platelets 196 10*3/mm3      Neutrophil % 58.6 %      Lymphocyte % 28.7 %      Monocyte % 9.8 %      Eosinophil % 2.2 %      Basophil % 0.5 %       Immature Grans % 0.2 %      Neutrophils, Absolute 2.45 10*3/mm3      Lymphocytes, Absolute 1.20 10*3/mm3      Monocytes, Absolute 0.41 10*3/mm3      Eosinophils, Absolute 0.09 10*3/mm3      Basophils, Absolute 0.02 10*3/mm3      Immature Grans, Absolute 0.01 10*3/mm3      nRBC 0.0 /100 WBC     COVID PRE-OP / PRE-PROCEDURE SCREENING ORDER (NO ISOLATION) - Swab, Nasopharynx [[id number]]  (Normal) Collected: 01/21/21 2012    Specimen: Swab from Nasopharynx Updated: 01/21/21 2115    Narrative:      The following orders were created for panel order COVID PRE-OP / PRE-PROCEDURE SCREENING ORDER (NO ISOLATION) - Swab, Nasopharynx.  Procedure                               Abnormality         Status                     ---------                               -----------         ------                     COVID-19,[facility]...[[id number]]  Normal              Final result                 Please view results for these tests on the individual orders.    COVID-19,[facility] CEPHEID, NP SWAB IN TRANSPORT MEDIA 8-12 HR TAT - Swab, Nasopharynx [[id number]]  (Normal) Collected: 01/21/21 2012    Specimen: Swab from Nasopharynx Updated: 01/21/21 2115     COVID19 Not Detected    Narrative:      Fact sheet for providers: https://www.fda.gov/media/320004/download     Fact sheet for patients: https://www.fda.gov/media/932048/download          XR Chest 2 View   Final Result   No acute process.       This report was finalized on 1/21/2021 6:47 PM by Dr. Carlos Fowler M.D.            Assessment/Plan      Active Hospital Problems    Diagnosis  POA   • **Dysphagia [R13.10]  Yes   • CKD (chronic kidney disease) stage 3, GFR 30-59 ml/min (CMS/HCC) [N18.30]  Yes   • WAYNE (obstructive sleep apnea) [G47.33]  Yes   • Vitamin B12 deficiency [E53.8]  Yes   • Vitamin D deficiency [E55.9]  Yes   • HTN (hypertension) [I10]  Yes      Resolved Hospital Problems   No resolved problems to display.     Dysphagia  -swallowing difficulty after  reports of one of her pills getting stuck this morning, unable to swallow liquids or solids since  -GI consult and supposed to go to OR in AM for endo  -NPO after midnight  -IVF overnight    CKD3  -baseline renal function tonight  -avoid further nephrotoxins  -IVF as per above  -recheck labs in AM    HTN  -stable, will continue home medications once able to tolerate PO, may add IV PRN med if needed overnight    VTE Ppx  -SCDs    CODE status  -full    I discussed the patients findings and my recommendations with patient.    NAYANA Walls  [facility]  01/21/21  9:38 PM EST

## 2021-01-22 NOTE — ANESTHESIA PREPROCEDURE EVALUATION
Anesthesia Evaluation     Patient summary reviewed and Nursing notes reviewed   no history of anesthetic complications:  NPO Solid Status: > 6 hours  NPO Liquid Status: > 6 hours           Airway   Mallampati: II  TM distance: >3 FB  Neck ROM: full  no difficulty expected and No difficulty expected  Dental - normal exam     Pulmonary - normal exam    breath sounds clear to auscultation  (+) sleep apnea,   (-) rhonchi, decreased breath sounds, wheezes, rales, stridor  Cardiovascular - normal exam    NYHA Classification: I  Rhythm: regular  Rate: normal    (+) hypertension 2 medications or greater, past MI , CAD, cardiac stents PVD,   (-) murmur, weak pulses, friction rub, systolic click, carotid bruits, JVD, peripheral edema      Neuro/Psych  (+) syncope,     GI/Hepatic/Renal/Endo    (+)  GERD, GI bleeding , renal disease CRI,     ROS Comment: Dysphagia    Musculoskeletal     (+) back pain,   Abdominal  - normal exam    Abdomen: soft.   Substance History - negative use     OB/GYN negative ob/gyn ROS         Other   arthritis, blood dyscrasia anemia,                     Anesthesia Plan    ASA 3     MAC and general     intravenous induction     Anesthetic plan, all risks, benefits, and alternatives have been provided, discussed and informed consent has been obtained with: patient.

## 2021-01-22 NOTE — PAYOR COMM NOTE
"Linda Hsu (80 y.o. Female)       INITIAL CLINICAL FOR OBS ADMIT FOR [id number]      CONTACT LYLY HAIDER LPN  P# [phone]  F# [phone]        Date of Birth Social Security Number Address Home Phone MRN    1940  [id number] [facility] [address] [phone] [id number]    Muslim Marital Status          Rastafari        Admission Date Admission Type Admitting Provider Attending Provider Department, Room/Bed    1/21/21 Emergency Carlos Shin MD Beard, Lyle E, MD [address], P684/1    Discharge Date Discharge Disposition Discharge Destination                       Attending Provider: Luis Daniel Haile MD    Allergies: Sulfa Antibiotics    Isolation: None   Infection: None   Code Status: CPR    Ht: 152.4 cm (60\")   Wt: 69.9 kg (154 lb)    Admission Cmt: None   Principal Problem: Dysphagia [R13.10]                 Active Insurance as of 1/21/2021     Primary Coverage     Payor Plan Insurance Group Employer/Plan Group    [facility] [employer] MEDICARE REPLACEMENT      Payor Plan Address Payor Plan Phone Number Payor Plan Fax Number Effective Dates    [address] [address] [phone]  9/22/2017 - None Entered    [facility] [address]       Subscriber Name Subscriber Birth Date Member ID       LINDA HSU 1940 [id number]           Secondary Coverage     Payor Plan Insurance Group Employer/Plan Group    [place]      Payor Plan Address Payor Plan Phone Number Payor Plan Fax Number Effective Dates    [address] [phone]  9/22/2017 - None Entered    [facility] [id number]       Subscriber Name Subscriber Birth Date Member ID       LINDA HSU 1940 [id number]                 Emergency Contacts      (Rel.) Home Phone Work Phone Mobile Phone    NatachaShay castañeday (Daughter) [phone] -- --    Theron Roland (Relative) [phone] -- --    PorterShannan (Son) -- -- [phone]               History & " Physical      Soha Brooks APRN at 214     Attestation signed by Carlos Shin MD at 21 5192 (Updated)        Addendum: I have reviewed the history and plan as obtained by NAYANA Sebastian and preformed my own independent history adjust documentation as needed. I have personally examined the patient. My exam confirms her physical findings and I agree with the plan as listed above, with the addition to the followin-year-old woman with history of vitamin D deficiency, B12 deficiency, CKD 4, WAYNE, seasonal allergies, osteoarthritis, coronary artery disease status post a stent in , hypertension, gout, glaucoma, GERD, esophageal stricture presents unable to swallow with some regurgitation of undigested food after eating chicken earlier today. She also describes the sensation food sticking in her throat. She reports that she tried to drink some water to relieve this at home, but completely regurgitated the water. This prompted her to come to the ED. In the ED, she had a liquid challenge and she was able to swallow some water however when she tried some applesauce she regurgitated it. Denies any drooling, and appears to have been able to tolerate her saliva.     Vitals:    21 0141   BP: 148/70   Pulse: 66   Resp: 16   Temp: 97.6 °F (36.4 °C)   SpO2: 99%     Constitutional: alert and oriented, no distress  HEENT: perrla, eomi, ncat  Cards: rrr, no m/g/r  Pulm: ctab, nonlabored  Abd: soft, nt/nd  Ext: no peripheral edema    Dysphagia to liquids and solids-GI is consulted. Plan for endoscopy in the morning. NPO after midnight. IVF.    HTN  CKD3    Med rec is likely inaccurate and patient doesn't know medication dosages. Will need to contact pharmacy in the morning. In the mean time, will replace some medications with IV formulations.     Carlos Shin MD  02:06 EST                       Patient Name:  Loren Buenrostro  YOB: 1940  MRN:  [id number]  Admit Date:   1/21/2021  Patient Care Team:  System, Provider Not In as PCP - General      Chief Complaint   Patient presents with   • Difficulty Swallowing   • Heartburn       Subjective     Ms. Buenrostro is a 80 y.o. female with a history of HTN, CKD3, WAYNE, CAD, vitamin D and B12 deficiency, that presents to [facility] complaining of difficulty swallowing. Patient reports that she took her pills this morning at about 9 am, felt like one pill got stuck and had trouble getting it down but then experienced difficulty swallowing after that point. She reports that she was unable to keep water down, this has persisted all day and additionally reports increased gas/belching today. Patient denies history of this in the past. She denies fever, chest pain, shortness of breath, abdominal pain, changes in bowel or bladder habits, edema. She was reportedly able to swallow water while in ED tonight, but was unable to swallow applesauce she was given. Labs done tonight were unremarkable and CXR was negative. ED provider spoke with GI who agreed to take patient to OR for endo in the morning. Patient was given IV Pepcid in ED and reports relief of her gas since that time.     History of Present Illness    Past Medical History:   Diagnosis Date   • Abdominal pain, epigastric    • Abnormal weight loss    • Anemia    • Anemia of chronic disease    • Benign colon polyp    • Bloating    • Blunt head trauma    • Cardiomegaly    • Change in bowel movement    • Chest pain    • Coarse tremors    • Constipation    • Contact dermatitis    • Coronary artery disease    • Dermatitis    • Dyspnea    • Ear noise/buzzing    • Edema    • Encounter for screening mammogram for breast cancer    • Epistaxis    • Esophageal stricture    • Fatigue    • GERD (gastroesophageal reflux disease)    • GI bleed    • Glaucoma    • Gout    • Heart disease    • Hernia    • Herpes    • Herpes simplex    • History of colon polyps    • Hospital discharge follow-up    •  Hypertension    • Ingrown toenail    • Initial Medicare annual wellness visit    • Insomnia    • Iron deficiency anemia    • Leucopenia    • Medicare annual wellness visit, subsequent    • Muscle spasm    • Myocardial infarction (CMS/HCC)     2002- stent placed   • OA (osteoarthritis)    • Post-menopausal    • Rash    • Renal artery stenosis (CMS/HCC)    • Renal disorder    • Scalp pruritus    • Sciatica of left side    • Seasonal allergies    • Seborrheic dermatitis    • Sinusitis    • Sleep apnea, obstructive    • Stage 3 chronic kidney disease (CMS/HCC)     HISTORY OF   • 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 (CMS/[facility])    • Syncope    • UTI (urinary tract infection)    • Vitamin B12 deficiency    • Vitamin D deficiency      Past Surgical History:   Procedure Laterality Date   • COLONOSCOPY  11/07/2012    nonthrombosed external hemorrhoids,diverticulosis in the ascending colon, no-bleeding internal hemmorrhoids   • COLONOSCOPY  11/13/2014    internal/external hemorrhoids,diverticulosis sigmoid, descending, transverse, ascending, 2mm polyp transverse (BX)   • CORONARY ANGIOPLASTY  2002    cath laser angioplasty of lesion 2   • CYST REMOVAL     • TRANSLUMINAL ANGIOPLASTY     • UPPER GASTROINTESTINAL ENDOSCOPY  11/13/2014    benign appearing stricture dilated, erythematous mucosa antrum (BX)     Family History   Problem Relation Age of Onset   • Coronary artery disease Other    • Hypertension Other    • Diabetes Other    • Migraines Other    • Stroke Other    • Colon cancer Neg Hx    • Colon polyps Neg Hx      Social History     Tobacco Use   • Smoking status: Former Smoker   Substance Use Topics   • Alcohol use: No   • Drug use: No     Medications Prior to Admission   Medication Sig Dispense Refill Last Dose   • acyclovir (ZOVIRAX) 400 MG tablet Take 1 tablet by mouth Daily. 30 tablet 0    • ATENOLOL+SYRSPEND SF PO Take  by mouth.      • cetirizine (zyrTEC) 10 MG tablet Take 10 mg by mouth  Daily.      • CLONIDINE HCL PO Take  by mouth.      • diltiaZEM (TIAZAC) 240 MG 24 hr capsule 120 mg.  5    • famotidine (PEPCID) 20 MG tablet Take 20 mg by mouth 2 (Two) Times a Day.      • METOPROLOL SUCCINATE PO Take  by mouth.      • SPIRONOLACTONE PO Take  by mouth.      • timolol (TIMOPTIC) 0.5 % ophthalmic solution Administer 1 drop to both eyes Daily.      • torsemide (DEMADEX) 5 MG tablet Take 5 mg by mouth Daily.  5    • hydrALAZINE (APRESOLINE) 25 MG tablet Take 25 mg by mouth 3 (Three) Times a Day.      • labetalol (NORMODYNE) 200 MG tablet Take 1 tablet by mouth 2 (Two) Times a Day. 60 tablet 6    • potassium chloride (K-DUR) 10 MEQ CR tablet Take 1 tablet by mouth 2 (Two) Times a Day. 14 tablet 0    • raNITIdine (ZANTAC) 75 MG tablet Take 75 mg by mouth Daily.      • vitamin D (ERGOCALCIFEROL) 88207 units capsule capsule Take 50,000 Units by mouth Every 7 (Seven) Days.        Allergies:    Allergies   Allergen Reactions   • Sulfa Antibiotics        Review of Systems   Constitutional: Negative for chills and fever.   HENT: Negative.  Negative for congestion and sore throat.    Eyes: Negative.  Negative for visual disturbance.   Respiratory: Negative.  Negative for cough and shortness of breath.    Cardiovascular: Negative.  Negative for chest pain and leg swelling.   Gastrointestinal: Negative.  Negative for abdominal pain, nausea and vomiting.   Endocrine: Negative.    Genitourinary: Negative.  Negative for dysuria, frequency and urgency.   Musculoskeletal: Negative.  Negative for arthralgias and myalgias.   Skin: Negative.  Negative for color change and pallor.   Allergic/Immunologic: Negative.    Neurological: Positive for weakness. Negative for dizziness and light-headedness.   Hematological: Negative.    Psychiatric/Behavioral: Negative.  Negative for agitation, behavioral problems and confusion.        Objective    Vital Signs  Temp:  [97.5 °F (36.4 °C)] 97.5 °F (36.4 °C)  Heart Rate:  [54-74]  74  Resp:  [16-18] 16  BP: (142-189)/(68-90) 150/90  SpO2:  [83 %-99 %] 99 %  on   ;   Device (Oxygen Therapy): room air  Body mass index is 30.08 kg/m².    Physical Exam  Vitals signs and nursing note reviewed.   Constitutional:       General: She is not in acute distress.     Appearance: Normal appearance. She is normal weight.   HENT:      Head: Normocephalic and atraumatic.      Mouth/Throat:      Mouth: Mucous membranes are moist.   Eyes:      Extraocular Movements: Extraocular movements intact.   Neck:      Musculoskeletal: Normal range of motion and neck supple. No neck rigidity.   Cardiovascular:      Rate and Rhythm: Regular rhythm. Bradycardia present.      Pulses: Normal pulses.      Heart sounds: Normal heart sounds.   Pulmonary:      Effort: Pulmonary effort is normal. No respiratory distress.      Breath sounds: Normal breath sounds.   Abdominal:      General: Abdomen is flat. Bowel sounds are normal.      Palpations: Abdomen is soft.   Musculoskeletal: Normal range of motion.         General: No swelling.   Skin:     General: Skin is warm and dry.   Neurological:      General: No focal deficit present.      Mental Status: She is alert and oriented to person, place, and time. Mental status is at baseline.   Psychiatric:         Mood and Affect: Mood normal.         Behavior: Behavior normal.         Thought Content: Thought content normal.         Judgment: Judgment normal.         Results Review:   I reviewed the patient's new clinical results including all labs and xrays.    Lab Results (last 24 hours)     Procedure Component Value Units Date/Time    CBC & Differential [[id number]]  (Normal) Collected: 01/21/21 1816    Specimen: Blood Updated: 01/21/21 1831    Narrative:      The following orders were created for panel order CBC & Differential.  Procedure                               Abnormality         Status                     ---------                               -----------         ------                      CBC Auto Differential[[id number]]        Normal              Final result                 Please view results for these tests on the individual orders.    Comprehensive Metabolic Panel [[id number]]  (Abnormal) Collected: 01/21/21 1816    Specimen: Blood Updated: 01/21/21 1844     Glucose 92 mg/dL      BUN 13 mg/dL      Creatinine 1.31 mg/dL      Sodium 144 mmol/L      Potassium 3.9 mmol/L      Chloride 108 mmol/L      CO2 27.2 mmol/L      Calcium 9.6 mg/dL      Total Protein 6.4 g/dL      Albumin 3.70 g/dL      ALT (SGPT) 11 U/L      AST (SGOT) 14 U/L      Alkaline Phosphatase 72 U/L      Total Bilirubin 0.4 mg/dL      eGFR  African Amer 47 mL/min/1.73      Globulin 2.7 gm/dL      A/G Ratio 1.4 g/dL      BUN/Creatinine Ratio 9.9     Anion Gap 8.8 mmol/L     Narrative:      GFR Normal >60  Chronic Kidney Disease <60  Kidney Failure <15      Troponin [[id number]]  (Normal) Collected: 01/21/21 1816    Specimen: Blood Updated: 01/21/21 1844     Troponin T <0.010 ng/mL     Narrative:      Troponin T Reference Range:  <= 0.03 ng/mL-   Negative for AMI  >0.03 ng/mL-     Abnormal for myocardial necrosis.  Clinicians would have to utilize clinical acumen, EKG, Troponin and serial changes to determine if it is an Acute Myocardial Infarction or myocardial injury due to an underlying chronic condition.       Results may be falsely decreased if patient taking Biotin.      CBC Auto Differential [[id number]]  (Normal) Collected: 01/21/21 1816    Specimen: Blood Updated: 01/21/21 1831     WBC 4.18 10*3/mm3      RBC 4.74 10*6/mm3      Hemoglobin 13.1 g/dL      Hematocrit 39.3 %      MCV 82.9 fL      MCH 27.6 pg      MCHC 33.3 g/dL      RDW 14.2 %      RDW-SD 42.3 fl      MPV 9.8 fL      Platelets 196 10*3/mm3      Neutrophil % 58.6 %      Lymphocyte % 28.7 %      Monocyte % 9.8 %      Eosinophil % 2.2 %      Basophil % 0.5 %      Immature Grans % 0.2 %      Neutrophils, Absolute 2.45 10*3/mm3      Lymphocytes, Absolute  1.20 10*3/mm3      Monocytes, Absolute 0.41 10*3/mm3      Eosinophils, Absolute 0.09 10*3/mm3      Basophils, Absolute 0.02 10*3/mm3      Immature Grans, Absolute 0.01 10*3/mm3      nRBC 0.0 /100 WBC     COVID PRE-OP / PRE-PROCEDURE SCREENING ORDER (NO ISOLATION) - Swab, Nasopharynx [[id number]]  (Normal) Collected: 01/21/21 2012    Specimen: Swab from Nasopharynx Updated: 01/21/21 2115    Narrative:      The following orders were created for panel order COVID PRE-OP / PRE-PROCEDURE SCREENING ORDER (NO ISOLATION) - Swab, Nasopharynx.  Procedure                               Abnormality         Status                     ---------                               -----------         ------                     COVID-19,[facility]...[[id number]]  Normal              Final result                 Please view results for these tests on the individual orders.    COVID-19,[facility] CEPHEID, NP SWAB IN TRANSPORT MEDIA 8-12 HR TAT - Swab, Nasopharynx [[id number]]  (Normal) Collected: 01/21/21 2012    Specimen: Swab from Nasopharynx Updated: 01/21/21 2115     COVID19 Not Detected    Narrative:      Fact sheet for providers: https://www.fda.gov/media/785257/download     Fact sheet for patients: https://www.fda.gov/media/573728/download          XR Chest 2 View   Final Result   No acute process.       This report was finalized on 1/21/2021 6:47 PM by Dr. Carlos Fowler M.D.            Assessment/Plan      Active Hospital Problems    Diagnosis  POA   • **Dysphagia [R13.10]  Yes   • CKD (chronic kidney disease) stage 3, GFR 30-59 ml/min (CMS/[facility]) [N18.30]  Yes   • WAYNE (obstructive sleep apnea) [G47.33]  Yes   • Vitamin B12 deficiency [E53.8]  Yes   • Vitamin D deficiency [E55.9]  Yes   • HTN (hypertension) [I10]  Yes      Resolved Hospital Problems   No resolved problems to display.     Dysphagia  -swallowing difficulty after reports of one of her pills getting stuck this morning, unable to swallow liquids or solids  since  -GI consult and supposed to go to OR in AM for endo  -NPO after midnight  -IVF overnight    CKD3  -baseline renal function tonight  -avoid further nephrotoxins  -IVF as per above  -recheck labs in AM    HTN  -stable, will continue home medications once able to tolerate PO, may add IV PRN med if needed overnight    VTE Ppx  -SCDs    CODE status  -full    I discussed the patients findings and my recommendations with patient.    NAYANA Walls  [facility]  01/21/21  9:38 PM EST    Electronically signed by Carlos Shin MD at 01/22/21 0312          Emergency Department Notes      Chantale Parham, RN at 01/21/21 1610        Pt to ER via PV. Pt states that she is unable to keep anything down that started this morning. Pt c/o indigestion as well. Pt states hx of MI in 2002 and had similar symptoms.     Patient in mask. This RN in appropriate PPE - including mask, goggles, and gloves during all of patient care.       Electronically signed by Chantale Parham RN at 01/21/21 1612     Annie Ojeda, RN at 01/21/21 1807        Pt to ED with c/o drinking water at 0900 today and states it immediately came back up, indigestion, denies active CP , SOA at this time, reports feeling fatigued.     Pt placed in mask at triage, all staff wearing appropriate ppe       Electronically signed by Annie Ojeda RN at 01/21/21 1808     Megan Kirk, RN at 01/21/21 1855        Pt reports approx [id number] today, she felt like she could not swallow, felt like she had something blocking in esophagus with generalized weakness. Reports similar symptoms in 2002 when she had a MI. Denies chest pain or soa. Reports throat symptoms are gone although she feels fatigued. Pt wearing mask. This RN wearing mask and safety glasses. Reassurance given; call light in reach. Pts breathing even and unlabored. Pt appears in NAD at this time. Given warm blanket for comfort.      Megan Kirk,  RN  01/21/21 1905      Electronically signed by Megan Kirk RN at 01/21/21 1905     Alexandra Molina APRN at 01/21/21 1856          EMERGENCY DEPARTMENT ENCOUNTER    Room Number:  05/05  Date seen:  1/21/2021  Time seen: 18:56 EST  PCP: System, Provider Not In  Historian: patient  HPI:  Chief complaint:difficulty swallowing  A complete HPI/ROS/PMH/PSH/SH/FH are unobtainable due to: n/a  Context:Loren Buenrostro is a 80 y.o. female who presents to the ED with c/o inability to keep liquids down since this morning.  It is not made better or worse by anything.  She states that she ate some chicken this morning and then took some pills but that after that she was unable to keep even water down.  She has tried water on 3 different occasions today and every time she has belching and automatic vomiting.  She has no history of this problem before.  She denies any chest pain, shortness of breath, diaphoresis or nausea.  She has some complaints of indigestion.    Patient was placed in face mask in first look. Patient was wearing facemask when I entered the room and throughout our encounter. I wore full protective equipment throughout this patient encounter including a face mask, eye shield and gloves. Hand hygiene/washing of hands was performed before donning protective equipment and after removal when leaving the room.    MEDICAL RECORD REVIEW    ALLERGIES  Sulfa antibiotics    PAST MEDICAL HISTORY  Active Ambulatory Problems     Diagnosis Date Noted   • Hypertensive crisis 09/22/2017   • Parotiditis 09/23/2017   • IZA (acute kidney injury) (CMS/[facility]) 09/23/2017   • Localized edema 09/23/2017     Resolved Ambulatory Problems     Diagnosis Date Noted   • No Resolved Ambulatory Problems     Past Medical History:   Diagnosis Date   • Abdominal pain, epigastric    • Abnormal weight loss    • Anemia    • Anemia of chronic disease    • Benign colon polyp    • Bloating    • Blunt head trauma    • Cardiomegaly    • Change in  bowel movement    • Chest pain    • Coarse tremors    • Constipation    • Contact dermatitis    • Coronary artery disease    • Dermatitis    • Dyspnea    • Ear noise/buzzing    • Edema    • Encounter for screening mammogram for breast cancer    • Epistaxis    • Esophageal stricture    • Fatigue    • GERD (gastroesophageal reflux disease)    • GI bleed    • Glaucoma    • Gout    • Heart disease    • Hernia    • Herpes    • Herpes simplex    • History of colon polyps    • Hospital discharge follow-up    • Hypertension    • Ingrown toenail    • Initial Medicare annual wellness visit    • Insomnia    • Iron deficiency anemia    • Leucopenia    • Medicare annual wellness visit, subsequent    • Muscle spasm    • Myocardial infarction (CMS/HCC)    • OA (osteoarthritis)    • Post-menopausal    • Rash    • Renal artery stenosis (CMS/HCC)    • Renal disorder    • Scalp pruritus    • Sciatica of left side    • Seasonal allergies    • Seborrheic dermatitis    • Sinusitis    • Sleep apnea, obstructive    • Stage 3 chronic kidney disease (CMS/HCC)    • 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)    • Syncope    • UTI (urinary tract infection)    • Vitamin B12 deficiency    • Vitamin D deficiency        PAST SURGICAL HISTORY  Past Surgical History:   Procedure Laterality Date   • COLONOSCOPY  11/07/2012    nonthrombosed external hemorrhoids,diverticulosis in the ascending colon, no-bleeding internal hemmorrhoids   • COLONOSCOPY  11/13/2014    internal/external hemorrhoids,diverticulosis sigmoid, descending, transverse, ascending, 2mm polyp transverse (BX)   • CORONARY ANGIOPLASTY  2002    cath laser angioplasty of lesion 2   • CYST REMOVAL     • TRANSLUMINAL ANGIOPLASTY     • UPPER GASTROINTESTINAL ENDOSCOPY  11/13/2014    benign appearing stricture dilated, erythematous mucosa antrum (BX)       FAMILY HISTORY  Family History   Problem Relation Age of Onset   • Coronary artery disease Other    •  Hypertension Other    • Diabetes Other    • Migraines Other    • Stroke Other    • Colon cancer Neg Hx    • Colon polyps Neg Hx        SOCIAL HISTORY  Social History     Socioeconomic History   • Marital status:      Spouse name: Not on file   • Number of children: Not on file   • Years of education: Not on file   • Highest education level: Not on file   Tobacco Use   • Smoking status: Former Smoker   Substance and Sexual Activity   • Alcohol use: No   • Drug use: No   • Sexual activity: Defer       REVIEW OF SYSTEMS  Review of Systems    All systems reviewed and negative except for those discussed in HPI.     PHYSICAL EXAM    ED Triage Vitals   Temp Heart Rate Resp BP SpO2   01/21/21 1613 01/21/21 1613 01/21/21 1613 01/21/21 1810 01/21/21 1613   97.5 °F (36.4 °C) 65 18 142/68 99 %      Temp src Heart Rate Source Patient Position BP Location FiO2 (%)   -- -- -- -- --            Physical Exam    I have reviewed the triage vital signs and nursing notes.      GENERAL: not distressed  HENT: nares patent, mm moist, no posterior oropharynx erythema, exudates or tonsillar abscess EYES: no scleral icterus  NECK: no ROM limitations  CV: regular rhythm, regular rate, no murmur, no rubs and no gallops   RESPIRATORY: normal effort, clear to auscultate bilaterally ABDOMEN: soft, rounded, no focal tenderness, bowel sounds normal  : deferred  MUSCULOSKELETAL: no deformity  NEURO: alert, moves all extremities, follows commands  SKIN: warm, dry    I attempted to have patient drink some water taking small sips.  Several sips stayed down without any problems.  She did have some obvious belching with each sip of water.  I then escalated to applesauce at which point she had immediate regurgitation.    LAB RESULTS  Recent Results (from the past 24 hour(s))   ECG 12 Lead    Collection Time: 01/21/21  4:20 PM   Result Value Ref Range    QT Interval 438 ms   Comprehensive Metabolic Panel    Collection Time: 01/21/21  6:16 PM     Specimen: Blood   Result Value Ref Range    Glucose 92 65 - 99 mg/dL    BUN 13 8 - 23 mg/dL    Creatinine 1.31 (H) 0.57 - 1.00 mg/dL    Sodium 144 136 - 145 mmol/L    Potassium 3.9 3.5 - 5.2 mmol/L    Chloride 108 (H) 98 - 107 mmol/L    CO2 27.2 22.0 - 29.0 mmol/L    Calcium 9.6 8.6 - 10.5 mg/dL    Total Protein 6.4 6.0 - 8.5 g/dL    Albumin 3.70 3.50 - 5.20 g/dL    ALT (SGPT) 11 1 - 33 U/L    AST (SGOT) 14 1 - 32 U/L    Alkaline Phosphatase 72 39 - 117 U/L    Total Bilirubin 0.4 0.0 - 1.2 mg/dL    eGFR  African Amer 47 (L) >60 mL/min/1.73    Globulin 2.7 gm/dL    A/G Ratio 1.4 g/dL    BUN/Creatinine Ratio 9.9 7.0 - 25.0    Anion Gap 8.8 5.0 - 15.0 mmol/L   Troponin    Collection Time: 01/21/21  6:16 PM    Specimen: Blood   Result Value Ref Range    Troponin T <0.010 0.000 - 0.030 ng/mL   Light Blue Top    Collection Time: 01/21/21  6:16 PM   Result Value Ref Range    Extra Tube hold for add-on    Green Top (Gel)    Collection Time: 01/21/21  6:16 PM   Result Value Ref Range    Extra Tube Hold for add-ons.    Lavender Top    Collection Time: 01/21/21  6:16 PM   Result Value Ref Range    Extra Tube hold for add-on    Gold Top - SST    Collection Time: 01/21/21  6:16 PM   Result Value Ref Range    Extra Tube Hold for add-ons.    CBC Auto Differential    Collection Time: 01/21/21  6:16 PM    Specimen: Blood   Result Value Ref Range    WBC 4.18 3.40 - 10.80 10*3/mm3    RBC 4.74 3.77 - 5.28 10*6/mm3    Hemoglobin 13.1 12.0 - 15.9 g/dL    Hematocrit 39.3 34.0 - 46.6 %    MCV 82.9 79.0 - 97.0 fL    MCH 27.6 26.6 - 33.0 pg    MCHC 33.3 31.5 - 35.7 g/dL    RDW 14.2 12.3 - 15.4 %    RDW-SD 42.3 37.0 - 54.0 fl    MPV 9.8 6.0 - 12.0 fL    Platelets 196 140 - 450 10*3/mm3    Neutrophil % 58.6 42.7 - 76.0 %    Lymphocyte % 28.7 19.6 - 45.3 %    Monocyte % 9.8 5.0 - 12.0 %    Eosinophil % 2.2 0.3 - 6.2 %    Basophil % 0.5 0.0 - 1.5 %    Immature Grans % 0.2 0.0 - 0.5 %    Neutrophils, Absolute 2.45 1.70 - 7.00 10*3/mm3     Lymphocytes, Absolute 1.20 0.70 - 3.10 10*3/mm3    Monocytes, Absolute 0.41 0.10 - 0.90 10*3/mm3    Eosinophils, Absolute 0.09 0.00 - 0.40 10*3/mm3    Basophils, Absolute 0.02 0.00 - 0.20 10*3/mm3    Immature Grans, Absolute 0.01 0.00 - 0.05 10*3/mm3    nRBC 0.0 0.0 - 0.2 /100 WBC   COVID-19,[facility] CEPHEID, NP SWAB IN TRANSPORT MEDIA 8-12 HR TAT - Swab, Nasopharynx    Collection Time: 01/21/21  8:12 PM    Specimen: Nasopharynx; Swab   Result Value Ref Range    COVID19 Not Detected Not Detected - Ref. Range         RADIOLOGY RESULTS  XR Chest 2 View   Final Result   No acute process.       This report was finalized on 1/21/2021 6:47 PM by Dr. Carlos Fowler M.D.                PROGRESS, DATA ANALYSIS, CONSULTS AND MEDICAL DECISION MAKING  All labs have been independently reviewed by me.  All radiology studies have been reviewed by me and discussed with radiologist dictating the report.  EKG's independently viewed and interpreted by me unless stated otherwise. Discussion below represents my analysis of pertinent findings related to patient's condition, differential diagnosis, treatment plan and final disposition.     ED Course as of Jan 21 2135   Thu Jan 21, 2021   1855 I viewed chest x-ray on PACS.  My interpretation is no focal infiltrates    [EW]   1919 EKG   viewed by ER MD prior to my interpretation      EKG time: 1620  Rhythm/Rate: 55, sinus rhythm  P waves and IN: Normal IN, normal IN interval  QRS, axis: Normal QRS, normal axis  ST and T waves: No acute ST-T wave abnormality    Interpreted Contemporaneously by me, independently viewed  Similar to prior but improved from prior dated 11/18/2020 with regard to prolonged NOLAN.      [EW]   2017 Discussed patient with Dr. Johnson on-call for GI.  She would like to admit the patient overnight to medicine and she will put on the schedule in the a.m. for scope.    [EW]   2114 Discussed patient with NAYANA Hoyos on call for [facility].  Agrees to admit to Dr. Shin  " She is aware Dr. Johnson plans to take patient to Endo tomorrow for scope.     [EW]      ED Course User Index  [EW] Alexandra Molina APRN     DDX: esophageal food impaction, food bolus, dyspepsia    MDM:  Pt is nontoxic-appearing.  She does not need to go to the operating room tonight for removal of a food bolus.  She is actually very well-appearing.  I did discuss case with Dr. Goss on-call for GI and she will take patient to the scope in the morning for evaluation.  Preprocedure Covid testing ordered.  The patient is not hypoxic or tachycardic.     Reviewed pt's history and workup with Dr. Peacock.  After a bedside evaluation, Dr. Peacock agrees with the plan of care.    Based on the patient's lab findings and presenting symptoms, the doctor and I feel it is appropriate to admit the patient for further management, evaluation, and treatment.  I have discussed this with the admitting team.  I have also discussed this with the patient/family.  They are in agreement with admission.          Disposition vitals:  /79   Pulse 55   Temp 97.5 °F (36.4 °C)   Resp 18   Ht 152.4 cm (60\")   Wt 68.9 kg (152 lb)   SpO2 98%   BMI 29.69 kg/m²       DIAGNOSIS  Final diagnoses:   Difficulty swallowing liquids       Admission       Alexandra Molina APRN  01/21/21 2135      Electronically signed by Alexandra Molina APRN at 01/21/21 2135     Malika Fuentes RN at 01/21/21 1930        I wore full protective equipment throughout this patient encounter including a face mask, eye shield and gloves. Hand hygiene/washing of hands was performed before donning protective equipment and after removal when leaving the room.       Malika Fuentes RN  01/21/21 2041      Electronically signed by Malika Fuentes RN at 01/21/21 2041     Shiva Peacock MD at 01/21/21 1946        Pt presents to the ED c/o  been unable to eat or drink today.  Patient states that she had chicken earlier in the day.  She was able to take her " morning medications.  After that, she felt like something was stuck at the base of her throat.  When she tried to drink water or eat it would come back as undigested.  Approximately 3 PM, however, that sensation went away.  Unfortunately, she was still was unable to eat.  She came here we gave her a liquid challenge and she was able to swallow water.  However, when she tried to have some applesauce she vomited undigested applesauce.  Unfortunately, patient states she had similar symptoms when she had an MI approximately 15 to 20 years ago.     On exam,   Her heart is regular rate and rhythm without murmurs.  Lungs are clear to auscultation bilaterally.  Neck is supple without lymphadenopathy.  Her abdomen is normoactive bowel sounds, soft, nontender nondistended.     Plan: I agree with plan of ruling out an acute MI and then calling GI to see if they want to perform endoscopy this evening.    Patient was placed in face mask in first look. Patient was wearing facemask when I entered the room and throughout our encounter. I wore full protective equipment throughout this patient encounter including a face mask, eye shield and gloves. Hand hygiene was performed before donning protective equipment and after removal when leaving the room.       Attestation:  The [facility] and I have discussed this patient's history, physical exam, and treatment plan.  I have reviewed the documentation and personally had a face to face interaction with the patient. I affirm the documentation and agree with the treatment and plan.  The attached note describes my personal findings.            Shiva Peacock MD  01/21/21 1957      Electronically signed by Shiva Peacock MD at 01/21/21 1957     Eli Hong, RN at 01/21/21 2100        Pt son came to triage to check status of pt. The provider offered to come to triage to speak to son.        Eli Hong, RN  01/21/21 2101      Electronically signed by Eli Hong RN at 01/21/21 2101    "  Malika Fuentes, RN at 01/21/21 2141          Nursing report ED to floor  Loren Buenrostro  80 y.o.  female    HPI (triage note):   Chief Complaint   Patient presents with   • Difficulty Swallowing   • Heartburn       Admitting doctor:   Carlos Shin MD    Admitting diagnosis:   The encounter diagnosis was Difficulty swallowing liquids.    Code status:   Current Code Status     Date Active Code Status Order ID Comments User Context       Prior    Advance Care Planning Activity          Allergies:   Sulfa antibiotics    Weight:       01/21/21  1613   Weight: 68.9 kg (152 lb)       Most recent vitals:   Vitals:    01/21/21 1613 01/21/21 1810 01/21/21 1950 01/21/21 2120   BP:  142/68 176/79 (!) 189/90   Pulse: 65  55 54   Resp: 18      Temp: 97.5 °F (36.4 °C)      SpO2: 99%  98% (!) 83%   Weight: 68.9 kg (152 lb)      Height: 152.4 cm (60\")          Active LDAs/IV Access:   Lines, Drains & Airways    Active LDAs     Name:   Placement date:   Placement time:   Site:   Days:    Peripheral IV 01/21/21 1920 Right Antecubital   01/21/21 1920    Antecubital   less than 1                Labs (abnormal labs have a star):   Labs Reviewed   COMPREHENSIVE METABOLIC PANEL - Abnormal; Notable for the following components:       Result Value    Creatinine 1.31 (*)     Chloride 108 (*)     eGFR   Amer 47 (*)     All other components within normal limits    Narrative:     GFR Normal >60  Chronic Kidney Disease <60  Kidney Failure <15     COVID-19,[facility] CEPHEID, NP SWAB IN TRANSPORT MEDIA 8-12 HR TAT - Normal    Narrative:     Fact sheet for providers: https://www.fda.gov/media/943271/download     Fact sheet for patients: https://www.fda.gov/media/590174/download   TROPONIN (IN-HOUSE) - Normal    Narrative:     Troponin T Reference Range:  <= 0.03 ng/mL-   Negative for AMI  >0.03 ng/mL-     Abnormal for myocardial necrosis.  Clinicians would have to utilize clinical acumen, EKG, Troponin and serial changes to " determine if it is an Acute Myocardial Infarction or myocardial injury due to an underlying chronic condition.       Results may be falsely decreased if patient taking Biotin.     CBC WITH AUTO DIFFERENTIAL - Normal   COVID PRE-OP / PRE-PROCEDURE SCREENING ORDER (NO ISOLATION)    Narrative:     The following orders were created for panel order COVID PRE-OP / PRE-PROCEDURE SCREENING ORDER (NO ISOLATION) - Swab, Nasopharynx.  Procedure                               Abnormality         Status                     ---------                               -----------         ------                     COVID-19,[facility]...[[id number]]  Normal              Final result                 Please view results for these tests on the individual orders.   RAINBOW DRAW    Narrative:     The following orders were created for panel order [place] Draw.  Procedure                               Abnormality         Status                     ---------                               -----------         ------                     Light Blue Top[[id number]]                                   Final result               Green Top (Gel)[[id number]]                                  Final result               Lavender Top[[id number]]                                     Final result               Gold Top - SST[[id number]]                                   Final result                 Please view results for these tests on the individual orders.   CBC AND DIFFERENTIAL    Narrative:     The following orders were created for panel order CBC & Differential.  Procedure                               Abnormality         Status                     ---------                               -----------         ------                     CBC Auto Differential[[id number]]        Normal              Final result                 Please view results for these tests on the individual orders.   LIGHT BLUE TOP   GREEN TOP   LAVENDER TOP   GOLD TOP - SST       EKG:    ECG 12 Lead   Final Result   HEART RATE= 55  bpm   RR Interval= 1096  ms   [place] Interval= 206  ms   P Horizontal Axis= -10  deg   P Front Axis= 28  deg   QRSD Interval= 72  ms   QT Interval= 438  ms   QRS Axis= 6  deg   T Wave Axis= 39  deg   - BORDERLINE ECG -   Sinus rhythm   Probable left atrial enlargement   Prolonged NY interval   Electronically Signed By: [facility]) 21-Jan-2021 16:33:14   Date and Time of Study: 2021-01-21 16:20:08          Meds given in ED:   Medications   sodium chloride 0.9 % flush 10 mL (has no administration in time range)   famotidine (PEPCID) injection 20 mg (20 mg Intravenous Given 1/21/21 1927)       Imaging results:  Xr Chest 2 View    Result Date: 1/21/2021  No acute process.  This report was finalized on 1/21/2021 6:47 PM by Dr. Carlos Fowler M.D.        Ambulatory status:   - with assist    Social issues:   Social History     Socioeconomic History   • Marital status:      Spouse name: Not on file   • Number of children: Not on file   • Years of education: Not on file   • Highest education level: Not on file   Tobacco Use   • Smoking status: Former Smoker   Substance and Sexual Activity   • Alcohol use: No   • Drug use: No   • Sexual activity: Defer    Nursing report ED to floor     Malika Fuentes RN  01/21/21 2141      Electronically signed by Malika Fuentes RN at 01/21/21 2141       Physician Progress Notes (last 24 hours) (Notes from 01/21/21 0853 through 01/22/21 0853)    No notes of this type exist for this encounter.       Encounter Information     Department Encounter #   1/21/2021  6:39 PM  [address] [id number]   Loni Carter, RN   Registered Nurse      Plan of Care   Signed   Date of Service:  01/22/21 0438   Creation Time:  01/22/21 0438            Signed             Show:Clear all  []Manual[x]Template[]Copied    Added by:  [x]Loni Carter, RN    []Yesi for details  Goal Outcome Evaluation:  Plan of Care Reviewed With:  patient  Progress: no change  Outcome Summary: Admitted from ED d/t difficulty of swallowing liquids. GI consult called. NPO diet ordered. IVFs infusing as ordered. Hx of hypertension - monitoring blood pressure during the night - prn bp med and scheduled iv metoprolol ordered. Up ad bryan. Slept on and off. Labs ordered for this AM. Will continue to monitor.               ED to Hosp-Admission (Current) on 1/21/2021        Detailed Report          Consult Notes (last 24 hours) (Notes from 01/21/21 0853 through 01/22/21 0853)      Nicole Johnson MD at 01/22/21 0815      Consult Orders    1. Inpatient Gastroenterology Consult [[id number]] ordered by Soha Brooks APRN at 01/22/21 0006               [facility]  Initial Inpatient Consult Note    Referring Provider: CORBY    Reason for Consultation: Dysphagia, esophageal food impaction    Subjective     History of present illness:    80 y.o. female, not previously known to our service, that we are asked to see for dysphagia and esophageal food impaction.  Patient reports that she had 2 small pieces of chicken with her pills yesterday and the largest pill felt like it stuck in her upper chest.  She points to her sternal notch.  She tried to drink water but it came back up.  She waited several hours and tried water multiple times but was unable to keep it down.  She started to feel jittery and was having a lot of indigestion so she came to the emergency room for further evaluation.  She reports that this is similar to the way that she felt when she had a heart attack several years ago.  She did eventually feel like the sensation passed.  She was then able to swallow water.  She was given applesauce in the emergency room however and this came back up.  She reports a history of acid reflux.  She was on Zantac for many years until it was recalled.  She switched to Pepcid thereafter but reports that it did not have the same effect.  She has not been  taking it recently.  She reports a remote EGD she does not recall there being any abnormalities.  She is had multiple colonoscopies and she thinks she is had polyps.  Per her chart she had an EGD in 2014 with a benign esophageal stricture.    Past Medical History:  Past Medical History:   Diagnosis Date   • Abdominal pain, epigastric    • Abnormal weight loss    • Anemia    • Anemia of chronic disease    • Benign colon polyp    • Bloating    • Blunt head trauma    • Cardiomegaly    • Change in bowel movement    • Chest pain    • Coarse tremors    • Constipation    • Contact dermatitis    • Coronary artery disease    • Dermatitis    • Dyspnea    • Ear noise/buzzing    • Edema    • Encounter for screening mammogram for breast cancer    • Epistaxis    • Esophageal stricture    • Fatigue    • GERD (gastroesophageal reflux disease)    • GI bleed    • Glaucoma    • Gout    • Heart disease    • Hernia    • Herpes    • Herpes simplex    • History of colon polyps    • Hospital discharge follow-up    • Hypertension    • Ingrown toenail    • Initial Medicare annual wellness visit    • Insomnia    • Iron deficiency anemia    • Leucopenia    • Medicare annual wellness visit, subsequent    • Muscle spasm    • Myocardial infarction (CMS/HCC)     2002- stent placed   • OA (osteoarthritis)    • Post-menopausal    • Rash    • Renal artery stenosis (CMS/HCC)    • Renal disorder    • Scalp pruritus    • Sciatica of left side    • Seasonal allergies    • Seborrheic dermatitis    • Sinusitis    • Sleep apnea, obstructive    • Stage 3 chronic kidney disease (CMS/HCC)     HISTORY OF   • 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)    • Syncope    • UTI (urinary tract infection)    • Vitamin B12 deficiency    • Vitamin D deficiency      Past Surgical History:  Past Surgical History:   Procedure Laterality Date   • COLONOSCOPY  11/07/2012    nonthrombosed external hemorrhoids,diverticulosis in the ascending colon,  no-bleeding internal hemmorrhoids   • COLONOSCOPY  11/13/2014    internal/external hemorrhoids,diverticulosis sigmoid, descending, transverse, ascending, 2mm polyp transverse (BX)   • CORONARY ANGIOPLASTY  2002    cath laser angioplasty of lesion 2   • CYST REMOVAL     • TRANSLUMINAL ANGIOPLASTY     • UPPER GASTROINTESTINAL ENDOSCOPY  11/13/2014    benign appearing stricture dilated, erythematous mucosa antrum (BX)      Social History:   Social History     Tobacco Use   • Smoking status: Former Smoker   Substance Use Topics   • Alcohol use: No      Family History:  Family History   Problem Relation Age of Onset   • Coronary artery disease Other    • Hypertension Other    • Diabetes Other    • Migraines Other    • Stroke Other    • Colon cancer Neg Hx    • Colon polyps Neg Hx        Home Meds:  Medications Prior to Admission   Medication Sig Dispense Refill Last Dose   • acyclovir (ZOVIRAX) 400 MG tablet Take 1 tablet by mouth Daily. 30 tablet 0    • cetirizine (zyrTEC) 10 MG tablet Take 10 mg by mouth Daily.      • cloNIDine (CATAPRES) 0.1 MG tablet Take 0.1 mg by mouth As Needed for High Blood Pressure. SBP over 180      • diltiaZEM (TIAZAC) 240 MG 24 hr capsule 120 mg.  5    • famotidine (PEPCID) 20 MG tablet Take 20 mg by mouth 2 (Two) Times a Day.      • metoprolol succinate XL (TOPROL-XL) 25 MG 24 hr tablet Take 25 mg by mouth Daily.      • SPIRONOLACTONE PO Take  by mouth.      • timolol (TIMOPTIC) 0.5 % ophthalmic solution Administer 1 drop to both eyes Daily.      • torsemide (DEMADEX) 5 MG tablet Take 5 mg by mouth Daily.  5    • ATENOLOL+SYRSPEND SF PO Take  by mouth.      • CLONIDINE HCL PO Take  by mouth.      • hydrALAZINE (APRESOLINE) 25 MG tablet Take 25 mg by mouth 3 (Three) Times a Day.      • labetalol (NORMODYNE) 200 MG tablet Take 1 tablet by mouth 2 (Two) Times a Day. 60 tablet 6    • METOPROLOL SUCCINATE PO Take  by mouth.      • potassium chloride (K-DUR) 10 MEQ CR tablet Take 1 tablet by  mouth 2 (Two) Times a Day. 14 tablet 0    • raNITIdine (ZANTAC) 75 MG tablet Take 75 mg by mouth Daily.      • vitamin D (ERGOCALCIFEROL) 47543 units capsule capsule Take 50,000 Units by mouth Every 7 (Seven) Days.        Current Meds:   metoprolol tartrate, 2.5 mg, Intravenous, Q6H  pantoprazole, 40 mg, Intravenous, Q AM  sodium chloride, 10 mL, Intravenous, Q12H  timolol, 1 drop, Both Eyes, Daily      Allergies:  Allergies   Allergen Reactions   • Sulfa Antibiotics      Review of Systems  Pertinent items are noted in HPI, all other systems reviewed and negative     Objective     Vital Signs  Temp:  [97.5 °F (36.4 °C)-97.8 °F (36.6 °C)] 97.8 °F (36.6 °C)  Heart Rate:  [54-74] 68  Resp:  [16-18] 16  BP: (142-189)/(63-90) 143/63  Physical Exam:  General Appearance:    Alert, cooperative, in no acute distress   Head:    Normocephalic, without obvious abnormality, atraumatic   Eyes:          conjunctivae and sclerae normal, no   icterus   Throat:   no thrush, oral mucosa moist   Neck:   Supple, no adenopathy   Lungs:     Clear to auscultation bilaterally    Heart:    Regular rhythm and normal rate    Chest Wall:    No abnormalities observed   Abdomen:     Soft, nondistended, nontender; normal bowel sounds   Extremities:   no edema, no redness   Skin:   No bruising or rash   Psychiatric:  normal mood and insight     Results Review:   I reviewed the patient's new clinical results.    Results from last 7 days   Lab Units 01/22/21  0747 01/21/21  1816   WBC 10*3/mm3 3.46 4.18   HEMOGLOBIN g/dL 13.5 13.1   HEMATOCRIT % 41.0 39.3   PLATELETS 10*3/mm3 192 196     Results from last 7 days   Lab Units 01/21/21  1816   SODIUM mmol/L 144   POTASSIUM mmol/L 3.9   CHLORIDE mmol/L 108*   CO2 mmol/L 27.2   BUN mg/dL 13   CREATININE mg/dL 1.31*   CALCIUM mg/dL 9.6   BILIRUBIN mg/dL 0.4   ALK PHOS U/L 72   ALT (SGPT) U/L 11   AST (SGOT) U/L 14   GLUCOSE mg/dL 92         Lab Results   Lab Value Date/Time    LIPASE 24 02/06/2018 1443        Radiology:  XR Chest 2 View   Final Result   No acute process.       This report was finalized on 1/21/2021 6:47 PM by Dr. Carlos Fowler M.D.              Assessment/Plan   Patient Active Problem List   Diagnosis   • HTN (hypertension)   • Parotiditis   • IZA (acute kidney injury) (CMS/[facility])   • Localized edema   • Difficulty swallowing liquids   • CKD (chronic kidney disease) stage 3, GFR 30-59 ml/min (CMS/[facility])   • WAYNE (obstructive sleep apnea)   • Dysphagia   • Vitamin B12 deficiency   • Vitamin D deficiency       Assessment:  1. Dysphagia  2. Esophageal food bolus-seems as though it has passed  3. History of esophageal stricture  4. GERD  5. Coronary artery disease    Plan:  · She is able to swallow water but she cannot swallow applesauce.  It is possible that some of the obstruction has passed or that it has floated more proximally into the esophagus to allow water to go around it.  Regardless she needs an EGD for further evaluation to ensure that there is no residual food in her esophagus, evaluate for narrowing and consideration of dilation if needed.  Risk and benefits benefits of the procedure were discussed with the patient she is agreeable and will proceed this morning      I discussed the patients findings and my recommendations with patient.    Nicole Johnson MD              Electronically signed by Nicole Johnson MD at 01/22/21 0820       All medication doses during the admission are shown, including meds that are no longer on order.   Scheduled Meds Sorted by Name  for Loren Buenrostro as of 1/22/21 through 1/22/21    1 Day 3 Days 7 Days 10 Days <  Today >     Legend:                          Inactive     Active     Other Encounter     Linked                 Medications 01/22/21   famotidine (PEPCID) injection 20 mg   Dose: 20 mg  Freq: Once Route: IV  Start: 01/21/21 1920 End: 01/21/21 1927    Admin Instructions:   Dilute to 10 mL total volume and give IV push over 2 minutes.        hydrALAZINE (APRESOLINE) injection 10 mg   Dose: 10 mg  Freq: 3 Times Daily Route: IV  Start: 01/22/21 0900 End: 01/22/21 0312    Admin Instructions:   Caution: Look alike/sound alike drug alert    0312-D/C'd                        metoprolol tartrate (LOPRESSOR) injection 2.5 mg   Dose: 2.5 mg  Freq: Every 6 Hours Route: IV  Start: 01/22/21 0400    (5717) [C]   1000   1600   2200                    pantoprazole (PROTONIX) injection 40 mg   Dose: 40 mg  Freq: Every Early Morning Route: IV  Indications of Use: STRESS ULCER PROPHYLAXIS  Start: 01/22/21 0600    Admin Instructions:   Dilute with 10 mL of 0.9% NaCl and give IV push over 2 minutes.    0527                          sodium chloride 0.9 % flush 10 mL   Dose: 10 mL  Freq: Every 12 Hours Scheduled Route: IV  Start: 01/22/21 0100    0020   (4938) [C]   2100                      timolol (TIMOPTIC) 0.5 % ophthalmic solution 1 drop   Dose: 1 drop  Freq: Daily Route: Both Eyes  Start: 01/22/21 0900    0734                         Medications 01/22/21       Continuous Meds Sorted by Name  for Loren Buenrostro as of 1/22/21 through 1/22/21   Legend:                          Inactive     Active     Other Encounter     Linked                 Medications 01/22/21   sodium chloride 0.9 % infusion   Rate: 100 mL/hr Dose: 100 mL/hr  Freq: Continuous Route: IV  Last Dose: 100 mL/hr (01/22/21 0458)  Start: 01/22/21 0100    0020   0458                            PRN Meds Sorted by Name  for Loren Buenrostro as of 1/22/21 through 1/22/21   Legend:                          Inactive     Active     Other Encounter     Linked                 Medications 01/22/21   acetaminophen (TYLENOL) tablet 650 mg   Dose: 650 mg  Freq: Every 4 Hours PRN Route: PO  PRN Reason: Mild Pain   Start: 01/22/21 0003    Admin Instructions:   Do not exceed 4 grams of acetaminophen in a 24 hr period.    If given for pain, use the following pain scale:   Mild Pain = Pain Score of 1-3, CPOT 1-2  Moderate  Pain = Pain Score of 4-6, CPOT 3-4  Severe Pain = Pain Score of 7-10, CPOT 5-8  Do not exceed 4 grams of acetaminophen in a 24 hr period. Max dose of 2gm for AST/ALT greater than 120 units/L      If given for pain, use the following pain scale:   Mild Pain = Pain Score of 1-3, CPOT 1-2  Moderate Pain = Pain Score of 4-6, CPOT 3-4  Severe Pain = Pain Score of 7-10, CPOT 5-8       Or  acetaminophen (TYLENOL) 160 MG/5ML solution 650 mg   Dose: 650 mg  Freq: Every 4 Hours PRN Route: PO  PRN Reason: Mild Pain   Start: 01/22/21 0003    Admin Instructions:   Do not exceed 4 grams of acetaminophen in a 24 hr period.    If given for pain, use the following pain scale:   Mild Pain = Pain Score of 1-3, CPOT 1-2  Moderate Pain = Pain Score of 4-6, CPOT 3-4  Severe Pain = Pain Score of 7-10, CPOT 5-8  Do not exceed 4 grams of acetaminophen in a 24 hr period. Max dose of 2gm for AST/ALT greater than 120 units/L      If given for pain, use the following pain scale:   Mild Pain = Pain Score of 1-3, CPOT 1-2  Moderate Pain = Pain Score of 4-6, CPOT 3-4  Severe Pain = Pain Score of 7-10, CPOT 5-8       Or  acetaminophen (TYLENOL) suppository 650 mg   Dose: 650 mg  Freq: Every 4 Hours PRN Route: RE  PRN Reason: Mild Pain   Start: 01/22/21 0003    Admin Instructions:   Do not exceed 4 grams of acetaminophen in a 24 hr period. Max dose of 2gm for AST/ALT greater than 120 units/L      If given for pain, use the following pain scale:   Mild Pain = Pain Score of 1-3, CPOT 1-2  Moderate Pain = Pain Score of 4-6, CPOT 3-4  Severe Pain = Pain Score of 7-10, CPOT 5-8       hydrALAZINE (APRESOLINE) injection 10 mg   Dose: 10 mg  Freq: Every 6 Hours PRN Route: IV  PRN Reason: High Blood Pressure  Start: 01/22/21 0312    Admin Instructions:   Prn SBP >180  Caution: Look alike/sound alike drug alert       ondansetron (ZOFRAN) injection 4 mg   Dose: 4 mg  Freq: Every 6 Hours PRN Route: IV  PRN Reasons: Nausea,Vomiting  Start: 01/22/21 0005    Admin  Instructions:   If BOTH ondansetron (ZOFRAN) and promethazine (PHENERGAN) are ordered use ondansetron first and THEN promethazine IF ondansetron is ineffective.       sodium chloride 0.9 % flush 10 mL   Dose: 10 mL  Freq: As Needed Route: IV  PRN Reason: Line Care  Start: 01/22/21 0003       sodium chloride 0.9 % flush 10 mL   Dose: 10 mL  Freq: As Needed Route: IV  PRN Reason: Line Care  Start: 01/21/21 1813       Medications 01/22/21

## 2021-01-22 NOTE — CONSULTS
[facility]  Initial Inpatient Consult Note    Referring Provider: A    Reason for Consultation: Dysphagia, esophageal food impaction    Subjective     History of present illness:    80 y.o. female, not previously known to our service, that we are asked to see for dysphagia and esophageal food impaction.  Patient reports that she had 2 small pieces of chicken with her pills yesterday and the largest pill felt like it stuck in her upper chest.  She points to her sternal notch.  She tried to drink water but it came back up.  She waited several hours and tried water multiple times but was unable to keep it down.  She started to feel jittery and was having a lot of indigestion so she came to the emergency room for further evaluation.  She reports that this is similar to the way that she felt when she had a heart attack several years ago.  She did eventually feel like the sensation passed.  She was then able to swallow water.  She was given applesauce in the emergency room however and this came back up.  She reports a history of acid reflux.  She was on Zantac for many years until it was recalled.  She switched to Pepcid thereafter but reports that it did not have the same effect.  She has not been taking it recently.  She reports a remote EGD she does not recall there being any abnormalities.  She is had multiple colonoscopies and she thinks she is had polyps.  Per her chart she had an EGD in 2014 with a benign esophageal stricture.    Past Medical History:  Past Medical History:   Diagnosis Date   • Abdominal pain, epigastric    • Abnormal weight loss    • Anemia    • Anemia of chronic disease    • Benign colon polyp    • Bloating    • Blunt head trauma    • Cardiomegaly    • Change in bowel movement    • Chest pain    • Coarse tremors    • Constipation    • Contact dermatitis    • Coronary artery disease    • Dermatitis    • Dyspnea    • Ear noise/buzzing    • Edema    • Encounter for  screening mammogram for breast cancer    • Epistaxis    • Esophageal stricture    • Fatigue    • GERD (gastroesophageal reflux disease)    • GI bleed    • Glaucoma    • Gout    • Heart disease    • Hernia    • Herpes    • Herpes simplex    • History of colon polyps    • Hospital discharge follow-up    • Hypertension    • Ingrown toenail    • Initial Medicare annual wellness visit    • Insomnia    • Iron deficiency anemia    • Leucopenia    • Medicare annual wellness visit, subsequent    • Muscle spasm    • Myocardial infarction (CMS/HCC)     2002- stent placed   • OA (osteoarthritis)    • Post-menopausal    • Rash    • Renal artery stenosis (CMS/HCC)    • Renal disorder    • Scalp pruritus    • Sciatica of left side    • Seasonal allergies    • Seborrheic dermatitis    • Sinusitis    • Sleep apnea, obstructive    • Stage 3 chronic kidney disease (CMS/HCC)     HISTORY OF   • 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)    • Syncope    • UTI (urinary tract infection)    • Vitamin B12 deficiency    • Vitamin D deficiency      Past Surgical History:  Past Surgical History:   Procedure Laterality Date   • COLONOSCOPY  11/07/2012    nonthrombosed external hemorrhoids,diverticulosis in the ascending colon, no-bleeding internal hemmorrhoids   • COLONOSCOPY  11/13/2014    internal/external hemorrhoids,diverticulosis sigmoid, descending, transverse, ascending, 2mm polyp transverse (BX)   • CORONARY ANGIOPLASTY  2002    cath laser angioplasty of lesion 2   • CYST REMOVAL     • TRANSLUMINAL ANGIOPLASTY     • UPPER GASTROINTESTINAL ENDOSCOPY  11/13/2014    benign appearing stricture dilated, erythematous mucosa antrum (BX)      Social History:   Social History     Tobacco Use   • Smoking status: Former Smoker   Substance Use Topics   • Alcohol use: No      Family History:  Family History   Problem Relation Age of Onset   • Coronary artery disease Other    • Hypertension Other    • Diabetes Other    •  Migraines Other    • Stroke Other    • Colon cancer Neg Hx    • Colon polyps Neg Hx        Home Meds:  Medications Prior to Admission   Medication Sig Dispense Refill Last Dose   • acyclovir (ZOVIRAX) 400 MG tablet Take 1 tablet by mouth Daily. 30 tablet 0    • cetirizine (zyrTEC) 10 MG tablet Take 10 mg by mouth Daily.      • cloNIDine (CATAPRES) 0.1 MG tablet Take 0.1 mg by mouth As Needed for High Blood Pressure. SBP over 180      • diltiaZEM (TIAZAC) 240 MG 24 hr capsule 120 mg.  5    • famotidine (PEPCID) 20 MG tablet Take 20 mg by mouth 2 (Two) Times a Day.      • metoprolol succinate XL (TOPROL-XL) 25 MG 24 hr tablet Take 25 mg by mouth Daily.      • SPIRONOLACTONE PO Take  by mouth.      • timolol (TIMOPTIC) 0.5 % ophthalmic solution Administer 1 drop to both eyes Daily.      • torsemide (DEMADEX) 5 MG tablet Take 5 mg by mouth Daily.  5    • ATENOLOL+SYRSPEND SF PO Take  by mouth.      • CLONIDINE HCL PO Take  by mouth.      • hydrALAZINE (APRESOLINE) 25 MG tablet Take 25 mg by mouth 3 (Three) Times a Day.      • labetalol (NORMODYNE) 200 MG tablet Take 1 tablet by mouth 2 (Two) Times a Day. 60 tablet 6    • METOPROLOL SUCCINATE PO Take  by mouth.      • potassium chloride (K-DUR) 10 MEQ CR tablet Take 1 tablet by mouth 2 (Two) Times a Day. 14 tablet 0    • raNITIdine (ZANTAC) 75 MG tablet Take 75 mg by mouth Daily.      • vitamin D (ERGOCALCIFEROL) 94838 units capsule capsule Take 50,000 Units by mouth Every 7 (Seven) Days.        Current Meds:   metoprolol tartrate, 2.5 mg, Intravenous, Q6H  pantoprazole, 40 mg, Intravenous, Q AM  sodium chloride, 10 mL, Intravenous, Q12H  timolol, 1 drop, Both Eyes, Daily      Allergies:  Allergies   Allergen Reactions   • Sulfa Antibiotics      Review of Systems  Pertinent items are noted in HPI, all other systems reviewed and negative     Objective     Vital Signs  Temp:  [97.5 °F (36.4 °C)-97.8 °F (36.6 °C)] 97.8 °F (36.6 °C)  Heart Rate:  [54-74] 68  Resp:  [16-18]  16  BP: (142-189)/(63-90) 143/63  Physical Exam:  General Appearance:    Alert, cooperative, in no acute distress   Head:    Normocephalic, without obvious abnormality, atraumatic   Eyes:          conjunctivae and sclerae normal, no   icterus   Throat:   no thrush, oral mucosa moist   Neck:   Supple, no adenopathy   Lungs:     Clear to auscultation bilaterally    Heart:    Regular rhythm and normal rate    Chest Wall:    No abnormalities observed   Abdomen:     Soft, nondistended, nontender; normal bowel sounds   Extremities:   no edema, no redness   Skin:   No bruising or rash   Psychiatric:  normal mood and insight     Results Review:   I reviewed the patient's new clinical results.    Results from last 7 days   Lab Units 01/22/21  0747 01/21/21  1816   WBC 10*3/mm3 3.46 4.18   HEMOGLOBIN g/dL 13.5 13.1   HEMATOCRIT % 41.0 39.3   PLATELETS 10*3/mm3 192 196     Results from last 7 days   Lab Units 01/21/21  1816   SODIUM mmol/L 144   POTASSIUM mmol/L 3.9   CHLORIDE mmol/L 108*   CO2 mmol/L 27.2   BUN mg/dL 13   CREATININE mg/dL 1.31*   CALCIUM mg/dL 9.6   BILIRUBIN mg/dL 0.4   ALK PHOS U/L 72   ALT (SGPT) U/L 11   AST (SGOT) U/L 14   GLUCOSE mg/dL 92         Lab Results   Lab Value Date/Time    LIPASE 24 02/06/2018 1448       Radiology:  XR Chest 2 View   Final Result   No acute process.       This report was finalized on 1/21/2021 6:47 PM by Dr. Carlos Fowler M.D.              Assessment/Plan   Patient Active Problem List   Diagnosis   • HTN (hypertension)   • Parotiditis   • IZA (acute kidney injury) (CMS/[facility])   • Localized edema   • Difficulty swallowing liquids   • CKD (chronic kidney disease) stage 3, GFR 30-59 ml/min (CMS/[facility])   • WAYNE (obstructive sleep apnea)   • Dysphagia   • Vitamin B12 deficiency   • Vitamin D deficiency       Assessment:  1. Dysphagia  2. Esophageal food bolus-seems as though it has passed  3. History of esophageal stricture  4. GERD  5. Coronary artery disease    Plan:  · She is  able to swallow water but she cannot swallow applesauce.  It is possible that some of the obstruction has passed or that it has floated more proximally into the esophagus to allow water to go around it.  Regardless she needs an EGD for further evaluation to ensure that there is no residual food in her esophagus, evaluate for narrowing and consideration of dilation if needed.  Risk and benefits benefits of the procedure were discussed with the patient she is agreeable and will proceed this morning      I discussed the patients findings and my recommendations with patient.    Nicole Johnson MD

## 2021-01-22 NOTE — ED PROVIDER NOTES
Pt presents to the ED c/o  been unable to eat or drink today.  Patient states that she had chicken earlier in the day.  She was able to take her morning medications.  After that, she felt like something was stuck at the base of her throat.  When she tried to drink water or eat it would come back as undigested.  Approximately 3 PM, however, that sensation went away.  Unfortunately, she was still was unable to eat.  She came here we gave her a liquid challenge and she was able to swallow water.  However, when she tried to have some applesauce she vomited undigested applesauce.  Unfortunately, patient states she had similar symptoms when she had an MI approximately 15 to 20 years ago.     On exam,   Her heart is regular rate and rhythm without murmurs.  Lungs are clear to auscultation bilaterally.  Neck is supple without lymphadenopathy.  Her abdomen is normoactive bowel sounds, soft, nontender nondistended.     Plan: I agree with plan of ruling out an acute MI and then calling GI to see if they want to perform endoscopy this evening.    Patient was placed in face mask in first look. Patient was wearing facemask when I entered the room and throughout our encounter. I wore full protective equipment throughout this patient encounter including a face mask, eye shield and gloves. Hand hygiene was performed before donning protective equipment and after removal when leaving the room.       Attestation:  The BRENT and I have discussed this patient's history, physical exam, and treatment plan.  I have reviewed the documentation and personally had a face to face interaction with the patient. I affirm the documentation and agree with the treatment and plan.  The attached note describes my personal findings.            Shiva Peacock MD  01/21/21 1957

## 2021-01-22 NOTE — ED NOTES
"Nursing report ED to floor  Loren Buenrostro  80 y.o.  female    HPI (triage note):   Chief Complaint   Patient presents with   • Difficulty Swallowing   • Heartburn       Admitting doctor:   Carlos Shin MD    Admitting diagnosis:   The encounter diagnosis was Difficulty swallowing liquids.    Code status:   Current Code Status     Date Active Code Status Order ID Comments User Context       Prior    Advance Care Planning Activity          Allergies:   Sulfa antibiotics    Weight:       01/21/21  1613   Weight: 68.9 kg (152 lb)       Most recent vitals:   Vitals:    01/21/21 1613 01/21/21 1810 01/21/21 1950 01/21/21 2120   BP:  142/68 176/79 (!) 189/90   Pulse: 65  55 54   Resp: 18      Temp: 97.5 °F (36.4 °C)      SpO2: 99%  98% (!) 83%   Weight: 68.9 kg (152 lb)      Height: 152.4 cm (60\")          Active LDAs/IV Access:   Lines, Drains & Airways    Active LDAs     Name:   Placement date:   Placement time:   Site:   Days:    Peripheral IV 01/21/21 1920 Right Antecubital   01/21/21 1920    Antecubital   less than 1                Labs (abnormal labs have a star):   Labs Reviewed   COMPREHENSIVE METABOLIC PANEL - Abnormal; Notable for the following components:       Result Value    Creatinine 1.31 (*)     Chloride 108 (*)     eGFR   Amer 47 (*)     All other components within normal limits    Narrative:     GFR Normal >60  Chronic Kidney Disease <60  Kidney Failure <15     COVID-19,[facility] IN-HOUSE CEPHEID, NP SWAB IN TRANSPORT MEDIA 8-12 HR TAT - Normal    Narrative:     Fact sheet for providers: https://www.fda.gov/media/038527/download     Fact sheet for patients: https://www.fda.gov/media/811620/download   TROPONIN (IN-HOUSE) - Normal    Narrative:     Troponin T Reference Range:  <= 0.03 ng/mL-   Negative for AMI  >0.03 ng/mL-     Abnormal for myocardial necrosis.  Clinicians would have to utilize clinical acumen, EKG, Troponin and serial changes to determine if it is an Acute Myocardial Infarction or " myocardial injury due to an underlying chronic condition.       Results may be falsely decreased if patient taking Biotin.     CBC WITH AUTO DIFFERENTIAL - Normal   COVID PRE-OP / PRE-PROCEDURE SCREENING ORDER (NO ISOLATION)    Narrative:     The following orders were created for panel order COVID PRE-OP / PRE-PROCEDURE SCREENING ORDER (NO ISOLATION) - Swab, Nasopharynx.  Procedure                               Abnormality         Status                     ---------                               -----------         ------                     COVID-19,[facility] IN-HOUSE...[[id number]]  Normal              Final result                 Please view results for these tests on the individual orders.   RAINBOW DRAW    Narrative:     The following orders were created for panel order [place] Draw.  Procedure                               Abnormality         Status                     ---------                               -----------         ------                     Light Blue Top[[id number]]                                   Final result               Green Top (Gel)[[id number]]                                  Final result               Lavender Top[[id number]]                                     Final result               Gold Top - SST[[id number]]                                   Final result                 Please view results for these tests on the individual orders.   CBC AND DIFFERENTIAL    Narrative:     The following orders were created for panel order CBC & Differential.  Procedure                               Abnormality         Status                     ---------                               -----------         ------                     CBC Auto Differential[[id number]]        Normal              Final result                 Please view results for these tests on the individual orders.   LIGHT BLUE TOP   GREEN TOP   LAVENDER TOP   GOLD TOP - SST       EKG:   ECG 12 Lead   Final Result   HEART RATE= 55  bpm   RR  Interval= 1096  ms   WY Interval= 206  ms   P Horizontal Axis= -10  deg   P Front Axis= 28  deg   QRSD Interval= 72  ms   QT Interval= 438  ms   QRS Axis= 6  deg   T Wave Axis= 39  deg   - BORDERLINE ECG -   Sinus rhythm   Probable left atrial enlargement   Prolonged WY interval   Electronically Signed By: Reji Ji ([facility]) 21-Jan-2021 16:33:14   Date and Time of Study: 2021-01-21 16:20:08          Meds given in ED:   Medications   sodium chloride 0.9 % flush 10 mL (has no administration in time range)   famotidine (PEPCID) injection 20 mg (20 mg Intravenous Given 1/21/21 1927)       Imaging results:  Xr Chest 2 View    Result Date: 1/21/2021  No acute process.  This report was finalized on 1/21/2021 6:47 PM by Dr. Carlos Fowler M.D.        Ambulatory status:   - with assist    Social issues:   Social History     Socioeconomic History   • Marital status:      Spouse name: Not on file   • Number of children: Not on file   • Years of education: Not on file   • Highest education level: Not on file   Tobacco Use   • Smoking status: Former Smoker   Substance and Sexual Activity   • Alcohol use: No   • Drug use: No   • Sexual activity: Defer    Nursing report ED to floor     Malika Fuentes RN  01/21/21 8108

## 2021-01-22 NOTE — PLAN OF CARE
Goal Outcome Evaluation:  Plan of Care Reviewed With: patient  Progress: no change  Outcome Summary: Admitted from ED d/t difficulty of swallowing liquids. GI consult called. NPO diet ordered. IVFs infusing as ordered. Hx of hypertension - monitoring blood pressure during the night - prn bp med and scheduled iv metoprolol ordered. Up ad bryan. Slept on and off. Labs ordered for this AM. Will continue to monitor.

## 2021-01-22 NOTE — ED NOTES
I wore full protective equipment throughout this patient encounter including a face mask, eye shield and gloves. Hand hygiene/washing of hands was performed before donning protective equipment and after removal when leaving the room.       Malika Fuentes RN  01/21/21 2041

## 2021-01-22 NOTE — ED NOTES
Pt reports approx [phone] today, she felt like she could not swallow, felt like she had something blocking in esophagus with generalized weakness. Reports similar symptoms in 2002 when she had a MI. Denies chest pain or soa. Reports throat symptoms are gone although she feels fatigued. Pt wearing mask. This RN wearing mask and safety glasses. Reassurance given; call light in reach. Pts breathing even and unlabored. Pt appears in NAD at this time. Given warm blanket for comfort.      Megan Kirk, RN  01/21/21 6445

## 2021-01-22 NOTE — ANESTHESIA POSTPROCEDURE EVALUATION
"Patient: Loren Buenrostro    Procedure Summary     Date: 01/22/21 Room / Location: [facility] OR 02 / [facility] MAIN OR    Anesthesia Start: 1050 Anesthesia Stop: 1113    Procedure: ESOPHAGOGASTRODUODENOSCOPY WITH 10-12 and 12-15 MM BALLOON DILATATION (N/A Esophagus) Diagnosis:       Difficulty swallowing liquids      Esophageal dysphagia      (Difficulty swallowing liquids [R13.10])      (Esophageal dysphagia [R13.10])    Surgeon: Nicole Johnson MD Provider: Jered Lopez MD    Anesthesia Type: MAC, general ASA Status: 3          Anesthesia Type: MAC, general    Vitals  Vitals Value Taken Time   /70 01/22/21 1130   Temp 36.5 °C (97.7 °F) 01/22/21 1111   Pulse 87 01/22/21 1142   Resp 16 01/22/21 1130   SpO2 100 % 01/22/21 1142   Vitals shown include unvalidated device data.        Post Anesthesia Care and Evaluation    Patient location during evaluation: bedside  Patient participation: complete - patient participated  Level of consciousness: sleepy but conscious  Pain score: 0  Pain management: adequate  Airway patency: patent  Anesthetic complications: No anesthetic complications    Cardiovascular status: acceptable  Respiratory status: acceptable  Hydration status: acceptable    Comments: /70   Pulse 91   Temp 36.5 °C (97.7 °F) (Oral)   Resp 16   Ht 152.4 cm (60\")   Wt 69.9 kg (154 lb)   SpO2 100%   BMI 30.08 kg/m²         "

## 2023-07-25 ENCOUNTER — HOSPITAL ENCOUNTER (EMERGENCY)
Facility: HOSPITAL | Age: 83
Discharge: HOME | End: 2023-07-25
Attending: EMERGENCY MEDICINE | Admitting: EMERGENCY MEDICINE
Payer: MEDICARE

## 2023-07-25 VITALS
HEIGHT: 60 IN | RESPIRATION RATE: 18 BRPM | BODY MASS INDEX: 30.23 KG/M2 | OXYGEN SATURATION: 98 % | SYSTOLIC BLOOD PRESSURE: 155 MMHG | HEART RATE: 76 BPM | TEMPERATURE: 98.4 F | DIASTOLIC BLOOD PRESSURE: 87 MMHG | WEIGHT: 154 LBS

## 2023-07-25 DIAGNOSIS — K11.20 SIALOADENITIS OF SUBMANDIBULAR GLAND: Primary | ICD-10-CM

## 2023-07-25 PROCEDURE — 99282 EMERGENCY DEPT VISIT SF MDM: CPT

## 2023-07-25 RX ORDER — CLINDAMYCIN HYDROCHLORIDE 300 MG/1
300 CAPSULE ORAL 3 TIMES DAILY
Qty: 30 CAPSULE | Refills: 0 | Status: SHIPPED | OUTPATIENT
Start: 2023-07-25 | End: 2023-08-04

## 2023-07-25 NOTE — ED NOTES
Patient to ER via car from home for swollen glad sore throat and ear pain x 8 days    Patient was seen and started on abx treatment without improvement

## 2023-07-25 NOTE — ED PROVIDER NOTES
EMERGENCY DEPARTMENT ENCOUNTER    Room Number:  T02/02  PCP: Ananda Gracia MD  Discussed/ obtained information from independent historians: Patient      HPI:  Chief Complaint: Facial swelling  A complete HPI/ROS/PMH/PSH/SH/FH are unobtainable due to: none  Context: Loren Buenrostro is a 83 y.o. female who presents to the ED c/o a swollen salivary gland present for the last 7-8 days that is persistent and not improving. She states she saw her PCP about a week ago and was prescribed amoxicillin. She's been drinking lemon juice occasionally without improvement. She states she has had this before and it resolved with antibiotics. It hurts more when she eats but she denies any difficulty swallowing or breathing as well as any fever, chills, nausea or sore throat.      External (non-ED) record review: Patient admitted and 9/25/2017 for hypertensive crisis and parotitis.  Dr. Pettit of ENT was consulted, it was mild and she was discharged on Augmentin and use of lemon drops and a heating pad.      PAST MEDICAL HISTORY  Active Ambulatory Problems     Diagnosis Date Noted    HTN (hypertension) 09/22/2017    Parotiditis 09/23/2017    IZA (acute kidney injury) 09/23/2017    Localized edema 09/23/2017    Difficulty swallowing liquids 01/21/2021    CKD (chronic kidney disease) stage 3, GFR 30-59 ml/min 01/22/2021    WAYNE (obstructive sleep apnea) 01/22/2021    Dysphagia 01/22/2021    Vitamin B12 deficiency 01/22/2021    Vitamin D deficiency 01/22/2021    Esophageal dysphagia 01/21/2021    Esophageal stricture 01/22/2021     Resolved Ambulatory Problems     Diagnosis Date Noted    No Resolved Ambulatory Problems     Past Medical History:   Diagnosis Date    Abdominal pain, epigastric     Abnormal weight loss     Anemia     Anemia of chronic disease     Benign colon polyp     Bloating     Blunt head trauma     Cardiomegaly     Change in bowel movement     Chest pain     Coarse tremors     Constipation     Contact dermatitis      Coronary artery disease     Dermatitis     Dyspnea     Ear noise/buzzing     Edema     Encounter for screening mammogram for breast cancer     Epistaxis     Fatigue     GERD (gastroesophageal reflux disease)     GI bleed     Glaucoma     Gout     Heart disease     Hernia     Herpes     Herpes simplex     History of colon polyps     Hospital discharge follow-up     Hypertension     Ingrown toenail     Initial Medicare annual wellness visit     Insomnia     Iron deficiency anemia     Leucopenia     Medicare annual wellness visit, subsequent     Muscle spasm     Myocardial infarction     OA (osteoarthritis)     Post-menopausal     Rash     Renal artery stenosis     Renal disorder     Scalp pruritus     Sciatica of left side     Seasonal allergies     Seborrheic dermatitis     Sinusitis     Sleep apnea, obstructive     Stage 3 chronic kidney disease     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     Syncope     UTI (urinary tract infection)          PAST SURGICAL HISTORY  Past Surgical History:   Procedure Laterality Date    COLONOSCOPY  11/07/2012    nonthrombosed external hemorrhoids,diverticulosis in the ascending colon, no-bleeding internal hemmorrhoids    COLONOSCOPY  11/13/2014    internal/external hemorrhoids,diverticulosis sigmoid, descending, transverse, ascending, 2mm polyp transverse (BX)    CORONARY ANGIOPLASTY  2002    cath laser angioplasty of lesion 2    CYST REMOVAL      ENDOSCOPY N/A 1/22/2021    Procedure: ESOPHAGOGASTRODUODENOSCOPY WITH 10-12 and 12-15 MM BALLOON DILATATION;  Surgeon: Nicole Johnson MD;  Location: [facility];  Service: Gastroenterology;  Laterality: N/A;    TRANSLUMINAL ANGIOPLASTY      UPPER GASTROINTESTINAL ENDOSCOPY  11/13/2014    benign appearing stricture dilated, erythematous mucosa antrum (BX)         FAMILY HISTORY  Family History   Problem Relation Age of Onset    Coronary artery disease Other     Hypertension Other     Diabetes Other      Migraines Other     Stroke Other     Colon cancer Neg Hx     Colon polyps Neg Hx     Malig Hyperthermia Neg Hx          SOCIAL HISTORY  Social History     Socioeconomic History    Marital status:    Tobacco Use    Smoking status: Former   Substance and Sexual Activity    Alcohol use: No    Drug use: No    Sexual activity: Defer         ALLERGIES  Sulfa antibiotics        REVIEW OF SYSTEMS  Review of Systems         PHYSICAL EXAM  ED Triage Vitals   Temp Heart Rate Resp BP SpO2   07/25/23 1510 07/25/23 1510 07/25/23 1510 07/25/23 1512 07/25/23 1510   98.4 °F (36.9 °C) 99 18 157/87 98 %      Temp src Heart Rate Source Patient Position BP Location FiO2 (%)   -- 07/25/23 1759 -- -- --    Monitor          Physical Exam      GENERAL: no acute distress  HENT: normocephalic, atraumatic, posterior oropharynx is clear moist with no oropharyngeal erythema edema or exudate.  No trismus.  Midline uvula.  She does have a swollen tender right submandibular gland.  No other significant adenopathy, no erythema or edema in the area otherwise.  Full range of motion of the neck.  Normal phonation.  EYES: no scleral icterus  CV: regular rhythm, normal rate  RESPIRATORY: normal effort CTA B  ABDOMEN: nondistended  MUSCULOSKELETAL: no deformity  NEURO: alert, moves all extremities, follows commands  PSYCH:  calm, cooperative  SKIN: warm, dry    Vital signs and nursing notes reviewed.      PROCEDURES  Procedures              MEDICATIONS GIVEN IN ER  Medications - No data to display                MEDICAL DECISION MAKING, PROGRESS, and CONSULTS    All labs have been independently reviewed by me.  All radiology studies have been reviewed by me and I have also reviewed the radiology report.   EKG's independently viewed and interpreted by me.  Discussion below represents my analysis of pertinent findings related to patient's condition, differential diagnosis, treatment plan and final disposition.            Orders placed during this  visit:  No orders of the defined types were placed in this encounter.          Differential diagnosis:  Sialoadenitis, abscess, adenopathy, airway compromise,      Independent interpretation of labs, radiology studies, and discussions with consultants:     The patient has no evidence of airway compromise, respiratory distress or significant infection.  She is afebrile and well-appearing, conversational.  Her symptoms are not worsening but are not improving.  She only has a couple doses of amoxicillin in the left.  We will switch her to clindamycin today and have her follow-up with ENT as she may need procedural intervention.  I have encouraged her to return to the ER for any worsening at all including but not limited to fever, difficulty swallowing, increasing swelling or pain.  She is agreeable with this plan.  Of counseled her to use sour candy, warm compresses.          DIAGNOSIS  Final diagnoses:   Sialoadenitis of submandibular gland           Follow Up:  Michi Pettit MD  [address] [id number] [phone]    Call in 1 day      [facility] EMERGENCY DEPARTMENT  [address] [facility] [id number] [phone]    If symptoms worsen      RX:     Medication List        New Prescriptions      clindamycin 300 MG capsule  Commonly known as: CLEOCIN  Take 1 capsule by mouth 3 (Three) Times a Day for 10 days.               Where to Get Your Medications        These medications were sent to [facility] [address] [place] [address] - [address] - [phone]  - [phone]   [address] [place] [address]      Phone: [phone]   clindamycin 300 MG capsule         Latest Documented Vital Signs:  As of 18:05 EDT  BP- 155/87 HR- 76 Temp- 98.4 °F (36.9 °C) O2 sat- 98%              --    Please note that portions of this were completed with a voice recognition program.       Note Disclaimer: At [facility], we believe that sharing information  builds trust and better relationships. You are receiving this note because you are receiving care at [facility] or recently visited. It is possible you will see health information before a provider has talked with you about it. This kind of information can be easy to misunderstand. To help you fully understand what it means for your health, we urge you to discuss this note with your provider.             Genet Steel PA  07/27/23 5738

## 2023-07-25 NOTE — DISCHARGE INSTRUCTIONS
Stop the amoxicillin  Start the new antibiotic as soon as possible.  Take a probiotic to help prevent diarrhea  Follow-up with the ENT listed or one of your choice, call them first thing tomorrow morning to schedule follow-up within 2 to 3 days    Return to the ER if any worse

## 2023-07-28 ENCOUNTER — HOSPITAL ENCOUNTER (EMERGENCY)
Facility: HOSPITAL | Age: 83
Discharge: HOME | End: 2023-07-28
Attending: EMERGENCY MEDICINE
Payer: MEDICARE

## 2023-07-28 VITALS
BODY MASS INDEX: 30.23 KG/M2 | HEIGHT: 60 IN | WEIGHT: 154 LBS | TEMPERATURE: 97 F | SYSTOLIC BLOOD PRESSURE: 177 MMHG | OXYGEN SATURATION: 100 % | RESPIRATION RATE: 16 BRPM | HEART RATE: 72 BPM | DIASTOLIC BLOOD PRESSURE: 77 MMHG

## 2023-07-28 DIAGNOSIS — K64.4 BLEEDING EXTERNAL HEMORRHOIDS: Primary | ICD-10-CM

## 2023-07-28 LAB
ABO GROUP BLD: NORMAL
ALBUMIN SERPL-MCNC: 4.1 G/DL (ref 3.5–5.2)
ALBUMIN/GLOB SERPL: 1.4 G/DL
ALP SERPL-CCNC: 71 U/L (ref 39–117)
ALT SERPL W P-5'-P-CCNC: 10 U/L (ref 1–33)
ANION GAP SERPL CALCULATED.3IONS-SCNC: 9.3 MMOL/L (ref 5–15)
AST SERPL-CCNC: 15 U/L (ref 1–32)
BASOPHILS # BLD AUTO: 0.04 10*3/MM3 (ref 0–0.2)
BASOPHILS NFR BLD AUTO: 1.2 % (ref 0–1.5)
BILIRUB SERPL-MCNC: 0.3 MG/DL (ref 0–1.2)
BLD GP AB SCN SERPL QL: NEGATIVE
BUN SERPL-MCNC: 21 MG/DL (ref 8–23)
BUN/CREAT SERPL: 13.5 (ref 7–25)
CALCIUM SPEC-SCNC: 10 MG/DL (ref 8.6–10.5)
CHLORIDE SERPL-SCNC: 105 MMOL/L (ref 98–107)
CO2 SERPL-SCNC: 23.7 MMOL/L (ref 22–29)
CREAT SERPL-MCNC: 1.55 MG/DL (ref 0.57–1)
D-LACTATE SERPL-SCNC: 1.3 MMOL/L (ref 0.5–2)
DEPRECATED RDW RBC AUTO: 41.9 FL (ref 37–54)
EGFRCR SERPLBLD CKD-EPI 2021: 33.3 ML/MIN/1.73
EOSINOPHIL # BLD AUTO: 0.12 10*3/MM3 (ref 0–0.4)
EOSINOPHIL NFR BLD AUTO: 3.5 % (ref 0.3–6.2)
ERYTHROCYTE [DISTWIDTH] IN BLOOD BY AUTOMATED COUNT: 14.4 % (ref 12.3–15.4)
GLOBULIN UR ELPH-MCNC: 2.9 GM/DL
GLUCOSE SERPL-MCNC: 89 MG/DL (ref 65–99)
HCT VFR BLD AUTO: 39.1 % (ref 34–46.6)
HGB BLD-MCNC: 13 G/DL (ref 12–15.9)
HOLD SPECIMEN: NORMAL
HOLD SPECIMEN: NORMAL
IMM GRANULOCYTES # BLD AUTO: 0.01 10*3/MM3 (ref 0–0.05)
IMM GRANULOCYTES NFR BLD AUTO: 0.3 % (ref 0–0.5)
LYMPHOCYTES # BLD AUTO: 1.1 10*3/MM3 (ref 0.7–3.1)
LYMPHOCYTES NFR BLD AUTO: 32.1 % (ref 19.6–45.3)
MCH RBC QN AUTO: 27.1 PG (ref 26.6–33)
MCHC RBC AUTO-ENTMCNC: 33.2 G/DL (ref 31.5–35.7)
MCV RBC AUTO: 81.6 FL (ref 79–97)
MONOCYTES # BLD AUTO: 0.42 10*3/MM3 (ref 0.1–0.9)
MONOCYTES NFR BLD AUTO: 12.2 % (ref 5–12)
NEUTROPHILS NFR BLD AUTO: 1.74 10*3/MM3 (ref 1.7–7)
NEUTROPHILS NFR BLD AUTO: 50.7 % (ref 42.7–76)
NRBC BLD AUTO-RTO: 0 /100 WBC (ref 0–0.2)
PLATELET # BLD AUTO: 249 10*3/MM3 (ref 140–450)
PMV BLD AUTO: 9.6 FL (ref 6–12)
POTASSIUM SERPL-SCNC: 4.4 MMOL/L (ref 3.5–5.2)
PROT SERPL-MCNC: 7 G/DL (ref 6–8.5)
RBC # BLD AUTO: 4.79 10*6/MM3 (ref 3.77–5.28)
RH BLD: POSITIVE
SODIUM SERPL-SCNC: 138 MMOL/L (ref 136–145)
T&S EXPIRATION DATE: NORMAL
WBC NRBC COR # BLD: 3.43 10*3/MM3 (ref 3.4–10.8)
WHOLE BLOOD HOLD COAG: NORMAL
WHOLE BLOOD HOLD SPECIMEN: NORMAL

## 2023-07-28 PROCEDURE — 86900 BLOOD TYPING SEROLOGIC ABO: CPT

## 2023-07-28 PROCEDURE — 36415 COLL VENOUS BLD VENIPUNCTURE: CPT

## 2023-07-28 PROCEDURE — 85025 COMPLETE CBC W/AUTO DIFF WBC: CPT

## 2023-07-28 PROCEDURE — 86901 BLOOD TYPING SEROLOGIC RH(D): CPT

## 2023-07-28 PROCEDURE — 99283 EMERGENCY DEPT VISIT LOW MDM: CPT

## 2023-07-28 PROCEDURE — 80053 COMPREHEN METABOLIC PANEL: CPT

## 2023-07-28 PROCEDURE — 83605 ASSAY OF LACTIC ACID: CPT

## 2023-07-28 PROCEDURE — 86850 RBC ANTIBODY SCREEN: CPT

## 2023-07-28 RX ORDER — PANTOPRAZOLE SODIUM 40 MG/1
40 TABLET, DELAYED RELEASE ORAL
COMMUNITY

## 2023-07-28 RX ORDER — HYDROCORTISONE 25 MG/G
CREAM TOPICAL 2 TIMES DAILY
Qty: 28 G | Refills: 0 | Status: SHIPPED | OUTPATIENT
Start: 2023-07-28

## 2023-07-28 RX ORDER — ACYCLOVIR 400 MG/1
1 TABLET ORAL 2 TIMES DAILY
COMMUNITY
Start: 2023-02-09

## 2023-07-28 RX ORDER — SODIUM CHLORIDE 0.9 % (FLUSH) 0.9 %
10 SYRINGE (ML) INJECTION AS NEEDED
Status: DISCONTINUED | OUTPATIENT
Start: 2023-07-28 | End: 2023-07-28 | Stop reason: HOSPADM

## 2023-07-28 RX ORDER — SPIRONOLACTONE 25 MG/1
1 TABLET ORAL DAILY
COMMUNITY
Start: 2023-04-05

## 2023-07-28 NOTE — ED PROVIDER NOTES
EMERGENCY DEPARTMENT ENCOUNTER    Room Number:  34/34  Date seen:  7/28/2023  PCP: Ananda Gracia MD  Historian: Patient      HPI:  Chief Complaint: Rectal bleeding  Context: Loren Buenrostro is a 83 y.o. female who presents to the ED c/o rectal bleeding that began today.  The patient states that she passed gas earlier today and noticed bright red blood in her toilet.  She states she went to her PCP who referred her to the emergency room for evaluation.  The patient states for the past week she has had some swelling under her right neck and saw her PCP who initially put her on amoxicillin and then was seen here 3 days ago and diagnosed with sialoadenitis and put on clindamycin.  Patient states she has had no change in the swelling under her chin.  She also states that she had no nausea vomiting abdominal pain or diarrhea.      PAST MEDICAL HISTORY  Active Ambulatory Problems     Diagnosis Date Noted    HTN (hypertension) 09/22/2017    Parotiditis 09/23/2017    IZA (acute kidney injury) 09/23/2017    Localized edema 09/23/2017    Difficulty swallowing liquids 01/21/2021    CKD (chronic kidney disease) stage 3, GFR 30-59 ml/min 01/22/2021    WAYNE (obstructive sleep apnea) 01/22/2021    Dysphagia 01/22/2021    Vitamin B12 deficiency 01/22/2021    Vitamin D deficiency 01/22/2021    Esophageal dysphagia 01/21/2021    Esophageal stricture 01/22/2021     Resolved Ambulatory Problems     Diagnosis Date Noted    No Resolved Ambulatory Problems     Past Medical History:   Diagnosis Date    Abdominal pain, epigastric     Abnormal weight loss     Anemia     Anemia of chronic disease     Benign colon polyp     Bloating     Blunt head trauma     Cardiomegaly     Change in bowel movement     Chest pain     Coarse tremors     Constipation     Contact dermatitis     Coronary artery disease     Dermatitis     Dyspnea     Ear noise/buzzing     Edema     Encounter for screening mammogram for breast cancer     Epistaxis      Fatigue     GERD (gastroesophageal reflux disease)     GI bleed     Glaucoma     Gout     Heart disease     Hernia     Herpes     Herpes simplex     History of colon polyps     Hospital discharge follow-up     Hypertension     Ingrown toenail     Initial Medicare annual wellness visit     Insomnia     Iron deficiency anemia     Leucopenia     Medicare annual wellness visit, subsequent     Muscle spasm     Myocardial infarction     OA (osteoarthritis)     Post-menopausal     Rash     Renal artery stenosis     Renal disorder     Scalp pruritus     Sciatica of left side     Seasonal allergies     Seborrheic dermatitis     Sinusitis     Sleep apnea, obstructive     Stage 3 chronic kidney disease     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       PAST SURGICAL HISTORY  Past Surgical History:   Procedure Laterality Date    COLONOSCOPY  11/07/2012    nonthrombosed external hemorrhoids,diverticulosis in the ascending colon, no-bleeding internal hemmorrhoids    COLONOSCOPY  11/13/2014    internal/external hemorrhoids,diverticulosis sigmoid, descending, transverse, ascending, 2mm polyp transverse (BX)    CORONARY ANGIOPLASTY  2002    cath laser angioplasty of lesion 2    CYST REMOVAL      ENDOSCOPY N/A 1/22/2021    Procedure: ESOPHAGOGASTRODUODENOSCOPY WITH 10-12 and 12-15 MM BALLOON DILATATION;  Surgeon: Nicole Johnson MD;  Location: [facility];  Service: Gastroenterology;  Laterality: N/A;    TRANSLUMINAL ANGIOPLASTY      UPPER GASTROINTESTINAL ENDOSCOPY  11/13/2014    benign appearing stricture dilated, erythematous mucosa antrum (BX)         FAMILY HISTORY  Family History   Problem Relation Age of Onset    Coronary artery disease Other     Hypertension Other     Diabetes Other     Migraines Other     Stroke Other     Colon cancer Neg Hx     Colon polyps  Neg Hx     Malig Hyperthermia Neg Hx          SOCIAL HISTORY  Social History     Socioeconomic History    Marital status:    Tobacco Use    Smoking status: Former   Substance and Sexual Activity    Alcohol use: No    Drug use: No    Sexual activity: Defer         ALLERGIES  Sulfa antibiotics      PHYSICAL EXAM  ED Triage Vitals   Temp Heart Rate Resp BP SpO2   07/28/23 1625 07/28/23 1625 07/28/23 1625 07/28/23 1627 07/28/23 1625   97 °F (36.1 °C) 70 18 159/90 98 %      Temp src Heart Rate Source Patient Position BP Location FiO2 (%)   07/28/23 1625 -- -- -- --   Tympanic           Physical Exam      GENERAL: 83-year-old female in no acute distress  HENT: NCAT: nares patent: Neck supple  EYES: no scleral icterus  CV: regular rhythm, normal rate  RESPIRATORY: normal effort  ABDOMEN: soft, NTND: Bowel sounds positive  Rectal: The patient has a large circumferential hemorrhoids: No thrombosed hemorrhoids: 1 small area of recent bleeding from her external hemorrhoid: On digital rectal exam the patient has brown heme-negative stool.  MUSCULOSKELETAL: no deformity  NEURO: alert with nonfocal neuro exam  PSYCH:  calm, cooperative  SKIN: warm, dry    Vital signs and nursing notes reviewed.      LAB RESULTS  Recent Results (from the past 24 hour(s))   Comprehensive Metabolic Panel    Collection Time: 07/28/23  5:02 PM    Specimen: Blood   Result Value Ref Range    Glucose 89 65 - 99 mg/dL    BUN 21 8 - 23 mg/dL    Creatinine 1.55 (H) 0.57 - 1.00 mg/dL    Sodium 138 136 - 145 mmol/L    Potassium 4.4 3.5 - 5.2 mmol/L    Chloride 105 98 - 107 mmol/L    CO2 23.7 22.0 - 29.0 mmol/L    Calcium 10.0 8.6 - 10.5 mg/dL    Total Protein 7.0 6.0 - 8.5 g/dL    Albumin 4.1 3.5 - 5.2 g/dL    ALT (SGPT) 10 1 - 33 U/L    AST (SGOT) 15 1 - 32 U/L    Alkaline Phosphatase 71 39 - 117 U/L    Total Bilirubin 0.3 0.0 - 1.2 mg/dL    Globulin 2.9 gm/dL    A/G Ratio 1.4 g/dL    BUN/Creatinine Ratio 13.5 7.0 - 25.0    Anion Gap 9.3 5.0 - 15.0  mmol/L    eGFR 33.3 (L) >60.0 mL/min/1.73   Type & Screen    Collection Time: 07/28/23  5:02 PM    Specimen: Blood   Result Value Ref Range    ABO Type O     RH type Positive     Antibody Screen Negative     T&S Expiration Date 7/31/2023 11:59:59 PM    Lactic Acid, Plasma    Collection Time: 07/28/23  5:02 PM    Specimen: Blood   Result Value Ref Range    Lactate 1.3 0.5 - 2.0 mmol/L   Green Top (Gel)    Collection Time: 07/28/23  5:02 PM   Result Value Ref Range    Extra Tube Hold for add-ons.    Lavender Top    Collection Time: 07/28/23  5:02 PM   Result Value Ref Range    Extra Tube hold for add-on    Gold Top - SST    Collection Time: 07/28/23  5:02 PM   Result Value Ref Range    Extra Tube Hold for add-ons.    Light Blue Top    Collection Time: 07/28/23  5:02 PM   Result Value Ref Range    Extra Tube Hold for add-ons.    CBC Auto Differential    Collection Time: 07/28/23  5:02 PM    Specimen: Blood   Result Value Ref Range    WBC 3.43 3.40 - 10.80 10*3/mm3    RBC 4.79 3.77 - 5.28 10*6/mm3    Hemoglobin 13.0 12.0 - 15.9 g/dL    Hematocrit 39.1 34.0 - 46.6 %    MCV 81.6 79.0 - 97.0 fL    MCH 27.1 26.6 - 33.0 pg    MCHC 33.2 31.5 - 35.7 g/dL    RDW 14.4 12.3 - 15.4 %    RDW-SD 41.9 37.0 - 54.0 fl    MPV 9.6 6.0 - 12.0 fL    Platelets 249 140 - 450 10*3/mm3    Neutrophil % 50.7 42.7 - 76.0 %    Lymphocyte % 32.1 19.6 - 45.3 %    Monocyte % 12.2 (H) 5.0 - 12.0 %    Eosinophil % 3.5 0.3 - 6.2 %    Basophil % 1.2 0.0 - 1.5 %    Immature Grans % 0.3 0.0 - 0.5 %    Neutrophils, Absolute 1.74 1.70 - 7.00 10*3/mm3    Lymphocytes, Absolute 1.10 0.70 - 3.10 10*3/mm3    Monocytes, Absolute 0.42 0.10 - 0.90 10*3/mm3    Eosinophils, Absolute 0.12 0.00 - 0.40 10*3/mm3    Basophils, Absolute 0.04 0.00 - 0.20 10*3/mm3    Immature Grans, Absolute 0.01 0.00 - 0.05 10*3/mm3    nRBC 0.0 0.0 - 0.2 /100 WBC       Ordered the above labs and reviewed the results.        RADIOLOGY  No Radiology Exams Resulted Within Past 24  Hours    Ordered the above noted radiological studies. Reviewed by me in PACS.            PROCEDURES  Procedures          MEDICATIONS GIVEN IN ER  Medications - No data to display            MEDICAL DECISION MAKING, PROGRESS, and CONSULTS    All labs have been independently reviewed by me.  All radiology studies have been reviewed by me and I have also reviewed the radiology report.   EKG's independently viewed and interpreted by me.  Discussion below represents my analysis of pertinent findings related to patient's condition, differential diagnosis, treatment plan and final disposition.      Additional sources:    - External (non-ED) record review: In review of the patient's chart it appears she has had a colonoscopy for which is shown internal and external hemorrhoids as well as diverticulosis    - Shared decision making: After shared decision-making discussion with myself and the patient we agree she is stable for discharge home and outpatient follow-up      Orders placed during this visit:  Orders Placed This Encounter   Procedures    [place] Draw    Comprehensive Metabolic Panel    Lactic Acid, Plasma    CBC Auto Differential    Undress & Gown    Measure Blood Pressure    Type & Screen    CBC & Differential    Green Top (Gel)    Lavender Top    Gold Top - SST    Light Blue Top         Differential diagnosis:  My differential diagnosis includes but is not limited to hemorrhoids, fissure, diverticulosis, AVM, cancer or colitis      Independent interpretation of labs, radiology studies, and discussions with consultants:  ED Course as of 07/28/23 2239 Fri Jul 28, 2023 1838 The patient's hemoglobin is normal and her creatinine is at baseline.  Her white count and lactate are normal. [GP]   1919 Repeat examination the patient's had no further bleeding.  I advised her of her hemorrhoids.  We will give her Anusol cream and have her follow-up with her PCP next week.  The patient understands and agrees with the plan.  [GP]      ED Course User Index  [GP] John Eldridge MD               DIAGNOSIS  Final diagnoses:   Bleeding external hemorrhoids         DISPOSITION  DISCHARGE    Patient discharged in stable condition.    Reviewed implications of results, diagnosis, meds, responsibility to follow up, warning signs and symptoms of possible worsening, potential complications and reasons to return to ER, including increased bleeding, lightheadedness or passing out.    Patient/Family voiced understanding of above instructions.    Discussed plan for discharge, as there is no emergent indication for admission.  Pt/family is agreeable and understands need for follow up and repeat testing.  Pt is aware that discharge does not mean that nothing is wrong but it indicates no emergency is present and they must continue care with follow-up as given below or physician of their choice.     FOLLOW-UP  Ananda Gracia MD  [facility] [id number] [phone]    In 3 days  If symptoms worsen                Latest Documented Vital Signs:  As of 22:39 EDT  BP- 177/77 HR- 72 Temp- 97 °F (36.1 °C) (Tympanic) O2 sat- 100%--      --------------------  Please note that portions of this were completed with a voice recognition program.       Note Disclaimer: At [facility], we believe that sharing information builds trust and better relationships. You are receiving this note because you are receiving care at [facility] or recently visited. It is possible you will see health information before a provider has talked with you about it. This kind of information can be easy to misunderstand. To help you fully understand what it means for your health, we urge you to discuss this note with your provider.             John Eldridge MD  07/28/23 9031

## 2023-07-28 NOTE — DISCHARGE INSTRUCTIONS
Use the cream twice a day.  Push fluids and increase fiber in your diet.  Follow-up your doctor next week for recheck or return if worse.

## 2023-08-10 ENCOUNTER — TRANSCRIBE ORDERS (OUTPATIENT)
Dept: ADMINISTRATIVE | Facility: HOSPITAL | Age: 83
End: 2023-08-10
Payer: MEDICARE

## 2023-08-10 DIAGNOSIS — K11.23 CHRONIC SIALOADENITIS: Primary | ICD-10-CM
